# Patient Record
Sex: FEMALE | Race: WHITE | Employment: OTHER | URBAN - METROPOLITAN AREA
[De-identification: names, ages, dates, MRNs, and addresses within clinical notes are randomized per-mention and may not be internally consistent; named-entity substitution may affect disease eponyms.]

---

## 2020-10-20 ENCOUNTER — OFFICE VISIT (OUTPATIENT)
Dept: OPHTHALMOLOGY | Facility: CLINIC | Age: 80
End: 2020-10-20
Attending: OPHTHALMOLOGY
Payer: COMMERCIAL

## 2020-10-20 DIAGNOSIS — H35.61 RETINAL HEMORRHAGE, RIGHT: ICD-10-CM

## 2020-10-20 DIAGNOSIS — H35.353 MACULAR EDEMA, CYSTOID, BILATERAL: ICD-10-CM

## 2020-10-20 DIAGNOSIS — H34.8120 CENTRAL RETINAL VEIN OCCLUSION WITH MACULAR EDEMA OF LEFT EYE (H): Primary | ICD-10-CM

## 2020-10-20 DIAGNOSIS — H35.352 MACULAR EDEMA, CYSTOID, LEFT: ICD-10-CM

## 2020-10-20 DIAGNOSIS — H40.003 GLAUCOMA SUSPECT OF BOTH EYES: ICD-10-CM

## 2020-10-20 PROBLEM — M54.16 LUMBAR NERVE ROOT ENTRAPMENT: Status: ACTIVE | Noted: 2019-08-28

## 2020-10-20 PROBLEM — Z95.0 PRESENCE OF PERMANENT CARDIAC PACEMAKER: Status: ACTIVE | Noted: 2018-09-10

## 2020-10-20 PROBLEM — M71.38 SYNOVIAL CYST OF LUMBAR SPINE: Status: ACTIVE | Noted: 2019-08-28

## 2020-10-20 PROBLEM — K56.609 SMALL BOWEL OBSTRUCTION (H): Status: ACTIVE | Noted: 2019-09-18

## 2020-10-20 PROBLEM — R42 VERTIGO: Status: ACTIVE | Noted: 2019-05-26

## 2020-10-20 PROCEDURE — G0463 HOSPITAL OUTPT CLINIC VISIT: HCPCS | Mod: 25

## 2020-10-20 PROCEDURE — 92015 DETERMINE REFRACTIVE STATE: CPT

## 2020-10-20 PROCEDURE — 92235 FLUORESCEIN ANGRPH MLTIFRAME: CPT | Performed by: OPHTHALMOLOGY

## 2020-10-20 PROCEDURE — 99207 OCT OPTIC NERVE RNFL SPECTRALIS OU (BOTH EYES): CPT | Performed by: OPHTHALMOLOGY

## 2020-10-20 PROCEDURE — 92133 CPTRZD OPH DX IMG PST SGM ON: CPT | Performed by: OPHTHALMOLOGY

## 2020-10-20 PROCEDURE — 250N000011 HC RX IP 250 OP 636: Performed by: OPHTHALMOLOGY

## 2020-10-20 PROCEDURE — 67028 INJECTION EYE DRUG: CPT | Mod: LT | Performed by: OPHTHALMOLOGY

## 2020-10-20 PROCEDURE — 99204 OFFICE O/P NEW MOD 45 MIN: CPT | Mod: 25 | Performed by: OPHTHALMOLOGY

## 2020-10-20 PROCEDURE — 92083 EXTENDED VISUAL FIELD XM: CPT | Performed by: OPHTHALMOLOGY

## 2020-10-20 PROCEDURE — 92134 CPTRZ OPH DX IMG PST SGM RTA: CPT | Performed by: OPHTHALMOLOGY

## 2020-10-20 RX ORDER — ASPIRIN 81 MG/1
81 TABLET ORAL DAILY
COMMUNITY

## 2020-10-20 RX ORDER — ACETAMINOPHEN 500 MG
1000 TABLET ORAL PRN
COMMUNITY
Start: 2019-08-28

## 2020-10-20 RX ORDER — POLYETHYLENE GLYCOL 3350 17 G/17G
17 POWDER, FOR SOLUTION ORAL PRN
COMMUNITY
Start: 2019-08-28

## 2020-10-20 RX ADMIN — Medication 1.25 MG: at 14:20

## 2020-10-20 ASSESSMENT — VISUAL ACUITY
OD_PH_CC+: -2
OD_PH_CC: 20/25
OD_CC: J1
OS_CC: 20/400
OD_CC: 20/40
CORRECTION_TYPE: GLASSES
OS_CC: 3'/200
METHOD: SNELLEN - LINEAR
OD_CC+: +2

## 2020-10-20 ASSESSMENT — CONF VISUAL FIELD
OS_NORMAL: 1
OD_INFERIOR_NASAL_RESTRICTION: 3
METHOD: COUNTING FINGERS

## 2020-10-20 ASSESSMENT — TONOMETRY
OS_IOP_MMHG: 32
OD_IOP_MMHG: 32
IOP_METHOD: APPLANATION

## 2020-10-20 ASSESSMENT — REFRACTION_WEARINGRX
OD_SPHERE: -1.00
OD_AXIS: 025
OD_CYLINDER: +1.00
OS_SPHERE: +2.50
SPECS_TYPE: SVL READING
OD_CYLINDER: SPHERE
OS_SPHERE: +1.50
OD_SPHERE: +2.50
OS_CYLINDER: SPHERE
SPECS_TYPE: SVL DIST
OS_AXIS: 045
OS_CYLINDER: +1.25

## 2020-10-20 ASSESSMENT — REFRACTION_MANIFEST
OD_CYLINDER: +2.00
OD_SPHERE: -1.50
OS_ADD: +2.75
OS_SPHERE: +1.50
OS_CYLINDER: +1.25
OD_ADD: +2.75
OD_AXIS: 165
OS_AXIS: 030

## 2020-10-20 ASSESSMENT — CUP TO DISC RATIO
OD_RATIO: 0.5
OS_RATIO: UNABLE

## 2020-10-20 ASSESSMENT — SLIT LAMP EXAM - LIDS
COMMENTS: PTOSIS
COMMENTS: PTOSIS

## 2020-10-20 ASSESSMENT — EXTERNAL EXAM - RIGHT EYE: OD_EXAM: NORMAL

## 2020-10-20 ASSESSMENT — EXTERNAL EXAM - LEFT EYE: OS_EXAM: NORMAL

## 2020-10-20 NOTE — PROGRESS NOTES
Chief Complaint(s) and History of Present Illness(es)     Decreased Vision Evaluation     Laterality: both eyes    Onset: gradual    Onset: 2 months ago    Quality: blurred vision    Severity: severe    Frequency: constantly    Timing: throughout the day    Context: near vision, distance vision, mid-range vision, reading,   computer work and driving    Course: gradually worsening    Associated symptoms: tearing (intermittent).  Negative for flashes,   floaters, itching, discharge, dryness and eye pain    Pain scale: 0/10        Comments     Bilateral cataract surgery done about 10 years ago. States no post op   complications. No post laser.  'Eyes fine my whole life until I needed cataract surgery', per pt.  Daughter, who assisting with obtaining POH, relates that before eye   surgery that there was a concern of optic nerve abnormalities. Imaging   done with normal findings so decision made to proceed with cataract   surgery.  VA very good until these last few month when VA diminished significantly   with LE exhibiting poor VA than RE.  Eye surgery done in [place]. Last  VA exam in [facility] where new   single vision for distance and near where prescribed. This done 'back   home' in [facility].  Denies hx: ocular injuries, amblyopia, strabismus, eye infections,   glaucoma, corneal disease, ophthalmic neuro findings.   PFOH: none known  PFHH: father with diabetes and stroke.    oRsaura Watts, COT COT 9:01 AM 10/20/2020     PMH: Hx colon cancer 20 years s/p resection, chemo, radiation, hx spinal surgery, Pacemaker   FHX: Denies glaucoma or AMD    POH: CEIOL each eye      Review of systems for the eyes was negative other than the pertinent positives/negatives listed in the HPI.      Assessment & Plan      Raheem Mark is a 80 year old female with the following diagnoses:   1. Central retinal vein occlusion with macular edema of left eye    2. Glaucoma suspect of both eyes    3. Macular edema, cystoid, left    4.  Retinal hemorrhage, right    5. Macular edema, cystoid, bilateral       URVASHI 4 months ago, [place], was told she has something wrong with lens left eye   CEIOL each eye in [place] 2012  No other history of eye surgery or eye disease    Painless vision loss left eye 8-9 months ago, no improvement  No GCA symptoms  Exam consistent with CRVO left eye and macular edema each eye     H/O arrythmia with cardioversion and pacemaker.  Denies history of HTN, Diabetes mellitus, clotting disorders, smoking.  AV nicking right eye and left eye central retinal vein occlusion suspicious for HTN, though there is no history of this per the patient and daughter.  Etiology also possible secondary to glaucoma or coagulopathy (H/O colon cancer - in remission)    Recommend BP check and coagulability work-up (Patient's daughter prefers to do this with cardiology)  Recommend Eylea today left eye, patient elects to proceed (insurance approval only for avastin, will start with this today)  Start Cosopt BID each eye   Follow-up in 1 month, sooner with changes  Return precautions reviewed     Patient disposition:   Return in about 4 weeks (around 11/17/2020) for Follow Up Loaiza likely Eylea OS .    Alda Hammonds MD  Ophthalmology Resident, PGY-4    Attending Physician Attestation:  Complete documentation of historical and exam elements from today's encounter can be found in the full encounter summary report (not reduplicated in this progress note).  I personally obtained the chief complaint(s) and history of present illness.  I confirmed and edited as necessary the review of systems, past medical/surgical history, family history, social history, and examination findings as documented by others; and I examined the patient myself.  I personally reviewed the relevant tests, images, and reports as documented above.  I formulated and edited as necessary the assessment and plan and discussed the findings and management plan with the patient and  family. Attending Physician Image/Tesing Attestation: I personally reviewed the ophthalmic test(s) associated with this encounter, agree with the interpretation(s) as documented by the resident/fellow, and have edited the corresponding report(s) as necessary.  Attending Physician Procedure Attestation: I was present for the entire procedure      - Rubens Loaiza MD

## 2020-10-20 NOTE — NURSING NOTE
Chief Complaints and History of Present Illnesses   Patient presents with     Decreased Vision Evaluation     Chief Complaint(s) and History of Present Illness(es)     Decreased Vision Evaluation     Laterality: both eyes    Onset: gradual    Onset: 2 months ago    Quality: blurred vision    Severity: severe    Frequency: constantly    Timing: throughout the day    Context: near vision, distance vision, mid-range vision, reading, computer work and driving    Course: gradually worsening    Associated symptoms: tearing (intermittent).  Negative for flashes, floaters, itching, discharge, dryness and eye pain    Pain scale: 0/10              Comments     Chief Complaints and History of Present Illnesses   Patient presents with     Decreased Vision Evaluation     Chief Complaint(s) and History of Present Illness(es)     Decreased Vision Evaluation     Laterality: both eyes    Onset: gradual    Onset: 2 months ago    Quality: blurred vision    Severity: severe    Frequency: constantly    Timing: throughout the day    Context: near vision, distance vision, mid-range vision, reading, computer work and driving    Course: gradually worsening    Associated symptoms: tearing (intermittent).  Negative for flashes, floaters, itching, discharge, dryness and eye pain    Pain scale: 0/10              Comments     Bilateral cataract surgery done about 10 years ago. States no post op complications. No post laser.  'Eyes fine my whole life until I needed cataract surgery', per pt.  Daughter, who assisting with obtaining POH, relates that before eye surgery that there was a concern of optic nerve abnormalities. Imaging done with normal findings so decision made to proceed with cataract surgery.  VA very good until these last few month when VA diminished significantly with LE exhibiting poor VA than RE.  Eye surgery done in [place]. Last  VA exam in [facility] where new single vision for distance and near where prescribed. This done  'back home' in [place].  Denies hx: ocular injuries, amblyopia, strabismus, eye infections, glaucoma, corneal disease, ophthalmic neuro findings.   PFOH: none known  PFHH: father with diabetes and stroke.    Rosaura Watts, COT COT 9:01 AM 10/20/2020

## 2020-10-21 ENCOUNTER — TELEPHONE (OUTPATIENT)
Dept: OPHTHALMOLOGY | Facility: CLINIC | Age: 80
End: 2020-10-21

## 2020-10-21 DIAGNOSIS — H40.003 GLAUCOMA SUSPECT OF BOTH EYES: Primary | ICD-10-CM

## 2020-10-21 RX ORDER — DORZOLAMIDE HYDROCHLORIDE AND TIMOLOL MALEATE 20; 5 MG/ML; MG/ML
1 SOLUTION/ DROPS OPHTHALMIC 2 TIMES DAILY
Qty: 1 BOTTLE | Refills: 11 | Status: SHIPPED | OUTPATIENT
Start: 2020-10-21 | End: 2021-08-12

## 2020-10-21 NOTE — TELEPHONE ENCOUNTER
Pt/daughter calling to review where eye drop from yesterday's visit was sent    Updated pharmacy in system and sent Rx for Cosopt to [facility] per request    Patrick Ghosh RN 1:48 PM 10/21/20

## 2020-11-24 ENCOUNTER — TELEPHONE (OUTPATIENT)
Dept: OPHTHALMOLOGY | Facility: CLINIC | Age: 80
End: 2020-11-24

## 2020-11-24 NOTE — TELEPHONE ENCOUNTER
[facility] Call Center    Phone Message    May a detailed message be left on voicemail: yes     Reason for Call: Other:   Pt's daughter, Dr Nguyen calling to reschedule pt's appt with Fadia. Daughter would like an appt the week of 12/14. Any day will work but request that it must be with Fadia because they need an injection. Please follow-up with Daugther.     Action Taken: Other:  eye    Travel Screening: Not Applicable

## 2020-11-25 NOTE — TELEPHONE ENCOUNTER
Previously reviewed by Dr. Loaiza and ok push out couple weeks in lieu of any new vision changes/symptoms    Scheduled December 17th at 8:15 AM    Pt/daughter aware of date/time/location at B    Patrick Ghosh RN 1:25 PM 11/25/20      [facility] Call Center    Phone Message    May a detailed message be left on voicemail: yes     Reason for Call: Other: Pt's Daughter callingt [facility] about her Mother seeing Dr Loaiza, she can be Reached at tn- [phone], we had the wrong # to call her back, Please call back to discuss  Thank you,    Action Taken: Message routed to:  [facility]    Travel Screening: Not Applicable

## 2020-12-01 ENCOUNTER — OFFICE VISIT (OUTPATIENT)
Dept: OPHTHALMOLOGY | Facility: CLINIC | Age: 80
End: 2020-12-01
Attending: OPHTHALMOLOGY
Payer: COMMERCIAL

## 2020-12-01 DIAGNOSIS — H04.129 DRY EYE: ICD-10-CM

## 2020-12-01 DIAGNOSIS — H35.352 MACULAR EDEMA, CYSTOID, LEFT: ICD-10-CM

## 2020-12-01 DIAGNOSIS — H02.135 SENILE ECTROPION OF BOTH LOWER EYELIDS: ICD-10-CM

## 2020-12-01 DIAGNOSIS — H34.8120 CENTRAL RETINAL VEIN OCCLUSION WITH MACULAR EDEMA OF LEFT EYE (H): Primary | ICD-10-CM

## 2020-12-01 DIAGNOSIS — H02.132 SENILE ECTROPION OF BOTH LOWER EYELIDS: ICD-10-CM

## 2020-12-01 PROCEDURE — 250N000011 HC RX IP 250 OP 636: Performed by: OPHTHALMOLOGY

## 2020-12-01 PROCEDURE — 67028 INJECTION EYE DRUG: CPT | Mod: LT | Performed by: OPHTHALMOLOGY

## 2020-12-01 PROCEDURE — G0463 HOSPITAL OUTPT CLINIC VISIT: HCPCS | Mod: 25

## 2020-12-01 PROCEDURE — 92134 CPTRZ OPH DX IMG PST SGM RTA: CPT | Performed by: OPHTHALMOLOGY

## 2020-12-01 RX ADMIN — AFLIBERCEPT 2 MG: 40 INJECTION, SOLUTION INTRAVITREAL at 11:06

## 2020-12-01 ASSESSMENT — VISUAL ACUITY
METHOD: SNELLEN - LINEAR
OD_SC: 20/25
OD_SC+: -1
OS_SC: 3'/200E

## 2020-12-01 ASSESSMENT — CONF VISUAL FIELD
OS_INFERIOR_NASAL_RESTRICTION: 2
OS_SUPERIOR_TEMPORAL_RESTRICTION: 2
OS_SUPERIOR_NASAL_RESTRICTION: 2
OS_INFERIOR_TEMPORAL_RESTRICTION: 2
OD_NORMAL: 1

## 2020-12-01 ASSESSMENT — REFRACTION_WEARINGRX
OS_SPHERE: +2.50
OD_SPHERE: -1.00
OD_CYLINDER: SPHERE
OS_SPHERE: +1.50
OS_CYLINDER: SPHERE
OD_AXIS: 025
SPECS_TYPE: SVL READING
SPECS_TYPE: SVL DIST
OS_CYLINDER: +1.25
OD_CYLINDER: +1.00
OS_AXIS: 045
OD_SPHERE: +2.50

## 2020-12-01 ASSESSMENT — TONOMETRY
OS_IOP_MMHG: 15
OD_IOP_MMHG: 15
IOP_METHOD: TONOPEN

## 2020-12-01 ASSESSMENT — EXTERNAL EXAM - RIGHT EYE: OD_EXAM: NORMAL

## 2020-12-01 ASSESSMENT — CUP TO DISC RATIO
OD_RATIO: 0.5
OS_RATIO: 0.45

## 2020-12-01 ASSESSMENT — EXTERNAL EXAM - LEFT EYE: OS_EXAM: NORMAL

## 2020-12-01 NOTE — PROGRESS NOTES
Chief Complaint(s) and History of Present Illness(es)     Central Retinal Vein Occlusion Follow Up     Laterality: left eye    Onset: 1 month ago           Comments     Pt. States that VA did improve slightly LE. Occasional pain BE. No   flashes or floaters BE.  Jojo Chidi COT 9:42 AM December 1, 2020         Ms. Mark here for follow up for CRVO, received intravitreal injection EYELEA x 1 LEFT eye 1 month ago.        Review of systems for the eyes was negative other than the pertinent positives/negatives listed in the HPI.      OCT MAC:   right eye: interval increase in retinal thickness nasal to macula, stil with good foveal contour  Left eye: large anatomical improvement in CME from 1 month ago, still with IRF, irregular contour, central involved     Assessment & Plan      Raheem Mark is a 80 year old female with the following diagnoses:   1. Central retinal vein occlusion with macular edema of left eye    2. Senile ectropion of both lower eyelids    3. Dry eye    4. Macular edema, cystoid, left       Plan  RBA to Eylea left eye today, consent  Obtained  Recommend low vision eval at next visit to help determine best visual potential for future goal discussion      Dry eye - secondary to poor lid function   Discussed with the patient trialing over the counter artificial tears gels and ointments for which are best tolerated.  Encouraged regular use due to significant dryness in lower corneas.     Patient disposition:   Return in about 4 weeks (around 12/29/2020) for VT, gonio, OCT mac; Low vision refraction same day if possible.    Winifred Tinsley MD PGY2  Department of Ophthalmology    Attending Physician Attestation:  Complete documentation of historical and exam elements from today's encounter can be found in the full encounter summary report (not reduplicated in this progress note).  I personally obtained the chief complaint(s) and history of present illness.  I confirmed and edited as necessary the  review of systems, past medical/surgical history, family history, social history, and examination findings as documented by others; and I examined the patient myself.  I personally reviewed the relevant tests, images, and reports as documented above.  I formulated and edited as necessary the assessment and plan and discussed the findings and management plan with the patient and family. .Attending Physician Procedure Attestation: I was present for the entire procedure      - Rubens Loaiza MD

## 2020-12-01 NOTE — NURSING NOTE
Chief Complaints and History of Present Illnesses   Patient presents with     Central Retinal Vein Occlusion Follow Up     Chief Complaint(s) and History of Present Illness(es)     Central Retinal Vein Occlusion Follow Up     Laterality: left eye    Onset: 1 month ago              Comments     Pt. States that VA did improve slightly LE. Occasional pain BE. No flashes or floaters BE.  Jojo Murillo COT 9:42 AM December 1, 2020

## 2020-12-10 ENCOUNTER — TELEPHONE (OUTPATIENT)
Dept: OPHTHALMOLOGY | Facility: CLINIC | Age: 80
End: 2020-12-10

## 2020-12-10 NOTE — TELEPHONE ENCOUNTER
[phone]  Rescheduled appt separately    Daughter aware of new dates and times and seemed comfortable with margie Ghosh RN 5:08 PM 12/10/20              [facility] Call [facility]    Phone Message    May a detailed message be left on voicemail: yes     Reason for Call: Other: Pt daughter requesting Pt Appt's be moved up to sometime in December before the end of the year - did not disclose why and said she would tell you when you call her back - she wants to move up the 01/07/21 Appts with Dr Saucedo and Dr Loaiza on same day again but sooner and this year in 2020 - please call Pt daughter Dr Ronal Nguyen back at updated correct Ph # in chart - Thanks     Action Taken: Message routed to:  [facility] E[facility]    Travel Screening: Not Applicable

## 2020-12-16 ENCOUNTER — OFFICE VISIT (OUTPATIENT)
Dept: OPHTHALMOLOGY | Facility: CLINIC | Age: 80
End: 2020-12-16
Payer: COMMERCIAL

## 2020-12-16 DIAGNOSIS — H52.11 MYOPIA OF RIGHT EYE: Primary | ICD-10-CM

## 2020-12-16 DIAGNOSIS — H52.4 PRESBYOPIA: ICD-10-CM

## 2020-12-16 DIAGNOSIS — H34.8120 CENTRAL RETINAL VEIN OCCLUSION WITH MACULAR EDEMA OF LEFT EYE (H): ICD-10-CM

## 2020-12-16 PROCEDURE — 92015 DETERMINE REFRACTIVE STATE: CPT | Performed by: OPTOMETRIST

## 2020-12-16 PROCEDURE — 92012 INTRM OPH EXAM EST PATIENT: CPT | Performed by: OPTOMETRIST

## 2020-12-16 ASSESSMENT — TONOMETRY
OS_IOP_MMHG: 16
IOP_METHOD: ICARE
OD_IOP_MMHG: 17

## 2020-12-16 ASSESSMENT — VISUAL ACUITY
OS_SC: 20/125
OS_SC+: -1
OD_SC+: -2
OD_SC: 20/40
METHOD: SNELLEN - LINEAR

## 2020-12-16 ASSESSMENT — REFRACTION_MANIFEST
OD_ADD: +2.50
OD_CYLINDER: SPHERE
OD_SPHERE: -0.75

## 2020-12-16 ASSESSMENT — CONF VISUAL FIELD
METHOD: COUNTING FINGERS
OS_NORMAL: 1
OD_NORMAL: 1

## 2020-12-16 NOTE — NURSING NOTE
Chief Complaints and History of Present Illnesses   Patient presents with     Consult For     Referral from Dr. Loaiza for Low Vision eval     Chief Complaint(s) and History of Present Illness(es)     Consult For     Laterality: left eye    Quality: blurred vision    Severity: severe    Course: stable    Associated symptoms: Negative for eye pain, dryness, tearing and discharge    Pain scale: 0/10    Comments: Referral from Dr. Loaiza for Low Vision eval              Comments     Pt complains of vision LE being very blurry.  Denies any pain, irritation, discharge, and tearing.  Ocular meds: Cosopt BID BE    Gill Tavera OT 12:10 PM December 16, 2020

## 2020-12-16 NOTE — PROGRESS NOTES
History  HPI     Consult For     In left eye.  Charactertized as  blurred vision.  Severity is severe.  Since onset it is stable.  Associated symptoms include Negative for eye pain, dryness, tearing and discharge.  Pain was noted as 0/10. Additional comments: Referral from Dr. Loaiza for Low Vision eval              Comments     Pt complains of vision LE being very blurry.  Denies any pain, irritation, discharge, and tearing.  Ocular meds: Cosopt BID BE    Gilllucio Lópezvin OT 12:10 PM December 16, 2020             Last edited by Gill Tavera on 12/16/2020 12:10 PM. (History)          Assessment/Plan  (H52.11) Myopia of right eye  (primary encounter diagnosis)  Comment: Simple myopia right eye; no improvement with refraction left eye.  Plan: Patient and daughter educated on condition and visual status.  New DVO and NVO SRx released.  Recommended separate glasses due to difficulty with mobility and lack of previous bifocal wear.  Advised full-time wear to protect better seeing eye.  Monitor yearly.    (H52.4) Presbyopia  Plan: See plan 1.    (H34.4120) Central retinal vein occlusion with macular edema of left eye  Comment: Stable.  Plan: Instructed patient to continue care with Dr. Loaiza.    Return to clinic in 1 year for comprehensive eye exam.    Jessica Cain, Optometry Student      I agree with the attached exam findings. I have reviewed and agree with the plan stated above.     Complete documentation of historical and exam elements from today's encounter can  be found in the full encounter summary report (not reduplicated in this progress  note). I personally obtained the chief complaint(s) and history of present illness. I  confirmed and edited as necessary the review of systems, past medical/surgical  history, family history, social history, and examination findings as documented by  others; and I examined the patient myself. I personally reviewed the relevant tests,  images, and reports as documented  above. I formulated and edited as necessary the  assessment and plan and discussed the findings and management plan with the  patient and family.     Ant Saucedo OD

## 2020-12-24 ENCOUNTER — OFFICE VISIT (OUTPATIENT)
Dept: OPHTHALMOLOGY | Facility: CLINIC | Age: 80
End: 2020-12-24
Attending: OPHTHALMOLOGY
Payer: COMMERCIAL

## 2020-12-24 DIAGNOSIS — H34.8120 CENTRAL RETINAL VEIN OCCLUSION WITH MACULAR EDEMA OF LEFT EYE (H): Primary | ICD-10-CM

## 2020-12-24 PROCEDURE — 92134 CPTRZ OPH DX IMG PST SGM RTA: CPT | Performed by: OPHTHALMOLOGY

## 2020-12-24 PROCEDURE — G0463 HOSPITAL OUTPT CLINIC VISIT: HCPCS

## 2020-12-24 PROCEDURE — 99213 OFFICE O/P EST LOW 20 MIN: CPT | Performed by: OPHTHALMOLOGY

## 2020-12-24 ASSESSMENT — CONF VISUAL FIELD
OS_NORMAL: 1
OD_NORMAL: 1
METHOD: COUNTING FINGERS

## 2020-12-24 ASSESSMENT — EXTERNAL EXAM - RIGHT EYE: OD_EXAM: NORMAL

## 2020-12-24 ASSESSMENT — VISUAL ACUITY
METHOD: SNELLEN - LINEAR
OS_SC+: -1
OS_SC: 20/125
OD_SC: 20/50
OD_SC+: +1

## 2020-12-24 ASSESSMENT — TONOMETRY
IOP_METHOD: ICARE
OD_IOP_MMHG: 12
OS_IOP_MMHG: 12

## 2020-12-24 ASSESSMENT — EXTERNAL EXAM - LEFT EYE: OS_EXAM: NORMAL

## 2020-12-24 NOTE — PROGRESS NOTES
Chief Complaint(s) and History of Present Illness(es)     Central Retinal Vein Occlusion Follow Up     Laterality: left eye    Associated symptoms: Negative for dryness, floaters and flashes    Pain scale: 0/10    Comments: 3 week f/u               Comments     Patient is using Cosopt BID OU: last used 8 pm last night, pt accompanied   with daughter, she states there is no new concerns or changes from last   visit. Unsure if there is any improvement with vision subjectively but   noted her OD visit VA was better. Pt ordered gls, has not picked up yet.     Rosanna Ramos COT December 24, 2020 8:15 AM              Review of systems for the eyes was negative other than the pertinent positives/negatives listed in the HPI.      Assessment & Plan      Raheem Mark is a 80 year old female with the following diagnoses:   1. Central retinal vein occlusion with macular edema of left eye         S/P Eylea left eye 12/1/20  S/P Avastin 10/20/20      Significant improvement in edema and vision today   Intraocular pressure acceptable  Monitor for now  Return precautions reviewed     Appreciate low vision eval.  Awaiting new glasses  Continue artificial tears and ointment at bedtime     Patient disposition:   Return in about 2 months (around 2/24/2021) for VT only, OCT Macula.           Attending Physician Attestation:  Complete documentation of historical and exam elements from today's encounter can be found in the full encounter summary report (not reduplicated in this progress note).  I personally obtained the chief complaint(s) and history of present illness.  I confirmed and edited as necessary the review of systems, past medical/surgical history, family history, social history, and examination findings as documented by others; and I examined the patient myself.  I personally reviewed the relevant tests, images, and reports as documented above.  I formulated and edited as necessary the assessment and plan and discussed the  findings and management plan with the patient and family. . - Rubens Loaiza MD

## 2020-12-24 NOTE — NURSING NOTE
Chief Complaints and History of Present Illnesses   Patient presents with     Central Retinal Vein Occlusion Follow Up     3 week f/u      Chief Complaint(s) and History of Present Illness(es)     Central Retinal Vein Occlusion Follow Up     Laterality: left eye    Associated symptoms: Negative for dryness, floaters and flashes    Pain scale: 0/10    Comments: 3 week f/u               Comments     Patient is using Cosopt BID OU: last used 8 pm last night, pt accompanied with daughter, she states there is no new concerns or changes from last visit. Unsure if there is any improvement with vision subjectively but noted her OD visit VA was better. Pt ordered gls, has not picked up yet.     Rosanna Ramos COT December 24, 2020 8:15 AM

## 2021-04-23 ENCOUNTER — TELEPHONE (OUTPATIENT)
Dept: OPHTHALMOLOGY | Facility: CLINIC | Age: 81
End: 2021-04-23

## 2021-04-23 NOTE — TELEPHONE ENCOUNTER
Scheduled may 4th at 0815     Pt/shelbie aware of date/time/location and seemed satisfied with appointment    Patrick Ghosh RN 10:57 AM 04/23/21        [facility] Call Center    Phone Message    May a detailed message be left on voicemail: yes     Reason for Call: Other: The pt is asking for an appt w/Dr. Loaiza the week of 5.3.21. His first avail is 5.18.21. Her daughter is home from [facility] and the pt needs her to bring her as she is 80 years old and in a wheelchair. Please call the pt to discuss. Thanks.     Action Taken: Message routed to:  [facility]    Travel Screening: Not Applicable

## 2021-05-04 ENCOUNTER — OFFICE VISIT (OUTPATIENT)
Dept: OPHTHALMOLOGY | Facility: CLINIC | Age: 81
End: 2021-05-04
Attending: OPHTHALMOLOGY
Payer: COMMERCIAL

## 2021-05-04 DIAGNOSIS — H34.8120 CENTRAL RETINAL VEIN OCCLUSION WITH MACULAR EDEMA OF LEFT EYE (H): Primary | ICD-10-CM

## 2021-05-04 DIAGNOSIS — Z96.1 PSEUDOPHAKIA OF BOTH EYES: ICD-10-CM

## 2021-05-04 DIAGNOSIS — H02.132 SENILE ECTROPION OF BOTH LOWER EYELIDS: ICD-10-CM

## 2021-05-04 DIAGNOSIS — H40.003 GLAUCOMA SUSPECT OF BOTH EYES: ICD-10-CM

## 2021-05-04 DIAGNOSIS — H02.135 SENILE ECTROPION OF BOTH LOWER EYELIDS: ICD-10-CM

## 2021-05-04 PROCEDURE — 92134 CPTRZ OPH DX IMG PST SGM RTA: CPT | Performed by: OPHTHALMOLOGY

## 2021-05-04 PROCEDURE — 250N000011 HC RX IP 250 OP 636: Performed by: OPHTHALMOLOGY

## 2021-05-04 PROCEDURE — 99214 OFFICE O/P EST MOD 30 MIN: CPT | Mod: 25 | Performed by: OPHTHALMOLOGY

## 2021-05-04 PROCEDURE — 67028 INJECTION EYE DRUG: CPT | Mod: LT | Performed by: OPHTHALMOLOGY

## 2021-05-04 PROCEDURE — G0463 HOSPITAL OUTPT CLINIC VISIT: HCPCS

## 2021-05-04 RX ADMIN — AFLIBERCEPT 2 MG: 40 INJECTION, SOLUTION INTRAVITREAL at 10:10

## 2021-05-04 ASSESSMENT — REFRACTION_WEARINGRX
SPECS_TYPE: SVL - DISTANCE
OD_SPHERE: +1.75
OS_CYLINDER: SPHERE
OS_SPHERE: +1.50
SPECS_TYPE: SVL READERS
OS_SPHERE: -0.50
OD_CYLINDER: SPHERE
OD_SPHERE: -0.75
OD_CYLINDER: SPHERE
OS_CYLINDER: SPHERE

## 2021-05-04 ASSESSMENT — CONF VISUAL FIELD
OS_SUPERIOR_TEMPORAL_RESTRICTION: 3
OD_SUPERIOR_NASAL_RESTRICTION: 3
OD_SUPERIOR_TEMPORAL_RESTRICTION: 3
OS_SUPERIOR_NASAL_RESTRICTION: 3

## 2021-05-04 ASSESSMENT — VISUAL ACUITY
CORRECTION_TYPE: GLASSES
OD_CC: 20/25
METHOD: SNELLEN - LINEAR
OD_CC+: -1
OS_CC: 20/500
OD_CC: J2
OS_CC: 20/400

## 2021-05-04 ASSESSMENT — EXTERNAL EXAM - RIGHT EYE: OD_EXAM: NORMAL

## 2021-05-04 ASSESSMENT — TONOMETRY
IOP_METHOD: TONOPEN
OS_IOP_MMHG: 12
OD_IOP_MMHG: 12

## 2021-05-04 ASSESSMENT — EXTERNAL EXAM - LEFT EYE: OS_EXAM: NORMAL

## 2021-05-04 ASSESSMENT — CUP TO DISC RATIO
OS_RATIO: 0.45
OD_RATIO: 0.5

## 2021-05-04 NOTE — PROGRESS NOTES
Review of systems for the eyes was negative other than the pertinent positives/negatives listed in the HPI.      Assessment & Plan      Raheem Mark is a 80 year old female with the following diagnoses:   1. Central retinal vein occlusion with macular edema of left eye    2. Glaucoma suspect of both eyes    3. Senile ectropion of both lower eyelids    4. Pseudophakia of both eyes         S/P Eylea left eye 12/1/20  S/P Avastin 10/20/20      Significant recurrence in left eye retinal edema with worsened vision. Difficulty in reading per daughter  No NV of retina, nerve, iris, angle today.  Intraocular pressure remains acceptable  RBA of left eye intravitreal Eylea today. Consent obtained.     Likely will need multiple Eylea for maximal edema improvement. Consider PRP and possibly STK after edema resolved  Repeat FA in near future  Return precautions reviewed   Continue artificial tears and ointment at bedtime     Patient disposition:   Return in about 1 month (around 6/4/2021) for dilated exam, oct macula, Eylea left eye. Sooner if needed.           Rashaun Dorantes MD  Department of Ophthalmology - PGY4      Attending Physician Attestation:  Complete documentation of historical and exam elements from today's encounter can be found in the full encounter summary report (not reduplicated in this progress note).  I personally obtained the chief complaint(s) and history of present illness.  I confirmed and edited as necessary the review of systems, past medical/surgical history, family history, social history, and examination findings as documented by others; and I examined the patient myself.  I personally reviewed the relevant tests, images, and reports as documented above.  I formulated and edited as necessary the assessment and plan and discussed the findings and management plan with the patient and family. Attending Physician Image/Tesing Attestation: I personally reviewed the ophthalmic test(s) associated with this  encounter, agree with the interpretation(s) as documented by the resident/fellow, and have edited the corresponding report(s) as necessary.   Attending Physician Procedure Attestation: I was present for the entire procedure     . - Rubens Loaiza MD

## 2021-05-04 NOTE — NURSING NOTE
Chief Complaints and History of Present Illnesses   Patient presents with     Follow Up     4 month f/u central retinal vein occlusion with macular edema of left eye     Chief Complaint(s) and History of Present Illness(es)     Follow Up     Laterality: left eye    Onset: 4.5 months ago    Associated symptoms: Negative for eye pain, flashes, floaters and dryness    Pain scale: 0/10    Comments: 4 month f/u central retinal vein occlusion with macular edema of left eye              Comments     Pt reports left eye vision seems worse - notes more difficulty with reading with her newer reading glasses  Pt's daughter states she hasn't needed to use AT's    Ocular meds:  Cosopt BID, both eyes    UNIQUE Valverde 8:41 AM May 4, 2021

## 2021-06-08 ENCOUNTER — OFFICE VISIT (OUTPATIENT)
Dept: OPHTHALMOLOGY | Facility: CLINIC | Age: 81
End: 2021-06-08
Attending: OPHTHALMOLOGY
Payer: COMMERCIAL

## 2021-06-08 DIAGNOSIS — H02.135 SENILE ECTROPION OF BOTH LOWER EYELIDS: ICD-10-CM

## 2021-06-08 DIAGNOSIS — H02.132 SENILE ECTROPION OF BOTH LOWER EYELIDS: ICD-10-CM

## 2021-06-08 DIAGNOSIS — H34.8120 CENTRAL RETINAL VEIN OCCLUSION WITH MACULAR EDEMA OF LEFT EYE (H): Primary | ICD-10-CM

## 2021-06-08 DIAGNOSIS — Z96.1 PSEUDOPHAKIA OF BOTH EYES: ICD-10-CM

## 2021-06-08 DIAGNOSIS — H40.003 GLAUCOMA SUSPECT OF BOTH EYES: ICD-10-CM

## 2021-06-08 PROCEDURE — G0463 HOSPITAL OUTPT CLINIC VISIT: HCPCS

## 2021-06-08 PROCEDURE — 92134 CPTRZ OPH DX IMG PST SGM RTA: CPT | Performed by: OPHTHALMOLOGY

## 2021-06-08 PROCEDURE — 67515 INJECT/TREAT EYE SOCKET: CPT | Mod: LT | Performed by: OPHTHALMOLOGY

## 2021-06-08 PROCEDURE — 250N000011 HC RX IP 250 OP 636: Performed by: OPHTHALMOLOGY

## 2021-06-08 RX ORDER — TRIAMCINOLONE ACETONIDE 40 MG/ML
40 INJECTION, SUSPENSION INTRA-ARTICULAR; INTRAMUSCULAR ONCE
Status: COMPLETED | OUTPATIENT
Start: 2021-06-08 | End: 2021-06-08

## 2021-06-08 RX ADMIN — TRIAMCINOLONE ACETONIDE 40 MG: 40 INJECTION, SUSPENSION INTRA-ARTICULAR; INTRAMUSCULAR at 10:57

## 2021-06-08 ASSESSMENT — VISUAL ACUITY
OS_PH_CC: 20/300
OS_CC: 20/500
CORRECTION_TYPE: GLASSES
METHOD: SNELLEN - LINEAR
OD_PH_CC+: -2
OD_PH_CC: 20/25
OD_CC: 20/40

## 2021-06-08 ASSESSMENT — CONF VISUAL FIELD
METHOD: COUNTING FINGERS
OD_SUPERIOR_NASAL_RESTRICTION: 3
OD_SUPERIOR_TEMPORAL_RESTRICTION: 3
OS_SUPERIOR_TEMPORAL_RESTRICTION: 3
OS_SUPERIOR_NASAL_RESTRICTION: 3

## 2021-06-08 ASSESSMENT — TONOMETRY
OD_IOP_MMHG: 17
IOP_METHOD: TONOPEN
OS_IOP_MMHG: 15

## 2021-06-08 ASSESSMENT — CUP TO DISC RATIO
OS_RATIO: 0.45
OD_RATIO: 0.5

## 2021-06-08 ASSESSMENT — EXTERNAL EXAM - LEFT EYE: OS_EXAM: NORMAL

## 2021-06-08 ASSESSMENT — EXTERNAL EXAM - RIGHT EYE: OD_EXAM: NORMAL

## 2021-06-08 NOTE — PROGRESS NOTES
Chief Complaint(s) and History of Present Illness(es)     Central Retinal Vein Occlusion Follow Up     Laterality: left eye    Quality: blurred vision    Frequency: constantly    Timing: throughout the day    Course: gradually improving    Associated symptoms: Negative for eye pain, redness, burning, dryness,   itching and discharge    Pain scale: 0/10    Comments: Recheck CRVO of LE              Comments     Pt states feels can see 'a little bit better'. Voices no new concerns.  Cosopt BID to BE / LD @ 8 am today.    Rosaura Watts, COT COT 9:56 AM 06/08/2021                Review of systems for the eyes was negative other than the pertinent positives/negatives listed in the HPI.      Assessment & Plan      Raheem Mark is a 81 year old female with the following diagnoses:   1. Central retinal vein occlusion with macular edema of left eye    2. Glaucoma suspect of both eyes    3. Pseudophakia of both eyes    4. Senile ectropion of both lower eyelids       S/P Eylea left eye 12/1/20; 5/4/2021  S/P Avastin 10/20/20    Resolution of retinal edema today.  No evidence of neovasularization on dilated fundus exam.  Notes vision has improved    Intraocular pressure remains acceptable  RBA of left eye intravitreal SubTenon's kenalog today. Consent obtained.     Will get OCT and Fluorescein angiography next visit  Consider PRP and possible repeat STK  Return precautions reviewed   Continue artificial tears and ointment at bedtime       Patient disposition:   Return in about 3 months (around 9/8/2021) for DFE, Optos FA (transit left); OCT Macula.           Attending Physician Attestation:  Complete documentation of historical and exam elements from today's encounter can be found in the full encounter summary report (not reduplicated in this progress note).  I personally obtained the chief complaint(s) and history of present illness.  I confirmed and edited as necessary the review of systems, past medical/surgical history,  family history, social history, and examination findings as documented by others; and I examined the patient myself.  I personally reviewed the relevant tests, images, and reports as documented above.  I formulated and edited as necessary the assessment and plan and discussed the findings and management plan with the patient and family. . - Rubens Loaiza MD

## 2021-06-08 NOTE — NURSING NOTE
Chief Complaints and History of Present Illnesses   Patient presents with     Central Retinal Vein Occlusion Follow Up     Recheck CRVO of LE     Chief Complaint(s) and History of Present Illness(es)     Central Retinal Vein Occlusion Follow Up     Laterality: left eye    Quality: blurred vision    Frequency: constantly    Timing: throughout the day    Course: gradually improving    Associated symptoms: Negative for eye pain, redness, burning, dryness, itching and discharge    Pain scale: 0/10    Comments: Recheck CRVO of LE              Comments     Pt states feels can see 'a little bit better'. Voices no new concerns.  Cosopt BID to BE / LD @ 8 am today.    MUNA Jiménez COT 9:56 AM 06/08/2021

## 2021-08-10 DIAGNOSIS — H34.8120 CENTRAL RETINAL VEIN OCCLUSION WITH MACULAR EDEMA OF LEFT EYE (H): Primary | ICD-10-CM

## 2021-08-12 ENCOUNTER — OFFICE VISIT (OUTPATIENT)
Dept: OPHTHALMOLOGY | Facility: CLINIC | Age: 81
End: 2021-08-12
Attending: OPHTHALMOLOGY
Payer: COMMERCIAL

## 2021-08-12 DIAGNOSIS — Z96.1 PSEUDOPHAKIA OF BOTH EYES: ICD-10-CM

## 2021-08-12 DIAGNOSIS — H40.003 GLAUCOMA SUSPECT OF BOTH EYES: Primary | ICD-10-CM

## 2021-08-12 DIAGNOSIS — H34.8120 CENTRAL RETINAL VEIN OCCLUSION WITH MACULAR EDEMA OF LEFT EYE (H): ICD-10-CM

## 2021-08-12 PROCEDURE — 99214 OFFICE O/P EST MOD 30 MIN: CPT | Mod: GC | Performed by: OPHTHALMOLOGY

## 2021-08-12 PROCEDURE — G0463 HOSPITAL OUTPT CLINIC VISIT: HCPCS | Mod: 25

## 2021-08-12 PROCEDURE — 92134 CPTRZ OPH DX IMG PST SGM RTA: CPT | Performed by: OPHTHALMOLOGY

## 2021-08-12 PROCEDURE — 92235 FLUORESCEIN ANGRPH MLTIFRAME: CPT | Performed by: OPHTHALMOLOGY

## 2021-08-12 RX ORDER — DORZOLAMIDE HYDROCHLORIDE AND TIMOLOL MALEATE 20; 5 MG/ML; MG/ML
1 SOLUTION/ DROPS OPHTHALMIC 2 TIMES DAILY
Qty: 60 ML | Refills: 3 | Status: SHIPPED | OUTPATIENT
Start: 2021-08-12 | End: 2022-08-25

## 2021-08-12 ASSESSMENT — VISUAL ACUITY
OD_CC+: +2
OD_CC: 20/25
METHOD: SNELLEN - LINEAR
OS_CC: 20/500
OS_SC: 20/500
OD_SC: 20/40

## 2021-08-12 ASSESSMENT — REFRACTION_WEARINGRX
OS_SPHERE: +1.50
OS_CYLINDER: SPHERE
OD_CYLINDER: SPHERE
OD_CYLINDER: SPHERE
OD_SPHERE: -0.75
SPECS_TYPE: SVL - DISTANCE
OS_SPHERE: -0.50
OD_SPHERE: +1.75
OS_CYLINDER: SPHERE
SPECS_TYPE: SVL READERS

## 2021-08-12 ASSESSMENT — EXTERNAL EXAM - RIGHT EYE: OD_EXAM: NORMAL

## 2021-08-12 ASSESSMENT — CONF VISUAL FIELD
OD_SUPERIOR_TEMPORAL_RESTRICTION: 3
OS_SUPERIOR_NASAL_RESTRICTION: 3
OD_SUPERIOR_NASAL_RESTRICTION: 3
OS_SUPERIOR_TEMPORAL_RESTRICTION: 3

## 2021-08-12 ASSESSMENT — CUP TO DISC RATIO
OD_RATIO: 0.5
OS_RATIO: 0.45

## 2021-08-12 ASSESSMENT — TONOMETRY
OS_IOP_MMHG: 19
OD_IOP_MMHG: 18
IOP_METHOD: TONOPEN

## 2021-08-12 ASSESSMENT — EXTERNAL EXAM - LEFT EYE: OS_EXAM: NORMAL

## 2021-08-12 NOTE — PROGRESS NOTES
Chief Complaint(s) and History of Present Illness(es)     Follow Up     Laterality: left eye    Course: stable    Associated symptoms: dryness (intermittent) and eye pain (inermittent,   mild).  Negative for redness and headache    Treatments tried: eye drops    Pain scale: 0/10    Comments: Central retinal vein occlusion with macular edema of left eye              Comments     She states that her vision has seemed stable in both eyes since her last   eye exam.  Both eyes feel dry at times.    She uses Cosopt twice a day in each eye.    MUNA Luz 2:22 PM  August 12, 2021               Review of systems for the eyes was negative other than the pertinent positives/negatives listed in the HPI.      Assessment & Plan      Raheem Mark is a 81 year old female with the following diagnoses:   1. Glaucoma suspect of both eyes    2. Central retinal vein occlusion with macular edema of left eye    3. Pseudophakia of both eyes         S/P Eylea left eye 12/1/20; 5/4/2021  S/P Avastin 10/20/20  S/P left eye STK June 2021    No retinal edema Left eye today.  No evidence of neovasularization on dilated fundus exam.   FA left eye shows macular edema, capillary leakages and possible peripheral ischemia    Right eye new single intraretinal fluid cyst on MAC OCT, not visually significant.   Unclear etiology, no leakage on Fluorescein angiography  Will continue to monitor.     Intraocular pressure remains acceptable    Will be travelling back to [facility] this Fall and returning in the Spring  Return precautions reviewed   Continue cosopt twice a day both eyes   Continue artificial tears and ointment at bedtime   Return sooner if vision worsens    Patient disposition:   Return in about 6 months (around 2/12/2022) for DFE, OCT Macula, OCT NFL.    Mildred Velazquez MD  Ophthalmology PGY-4      Attending Physician Attestation:  Complete documentation of historical and exam elements from today's encounter can be found in the full  encounter summary report (not reduplicated in this progress note).  I personally obtained the chief complaint(s) and history of present illness.  I confirmed and edited as necessary the review of systems, past medical/surgical history, family history, social history, and examination findings as documented by others; and I examined the patient myself.  I personally reviewed the relevant tests, images, and reports as documented above.  I formulated and edited as necessary the assessment and plan and discussed the findings and management plan with the patient and family.Attending Physician Image/Tesing Attestation: I personally reviewed the ophthalmic test(s) associated with this encounter, agree with the interpretation(s) as documented by the resident/fellow, and have edited the corresponding report(s) as necessary.   . - Rubens Loaiza MD

## 2021-08-12 NOTE — NURSING NOTE
Chief Complaints and History of Present Illnesses   Patient presents with     Follow Up     Central retinal vein occlusion with macular edema of left eye     Chief Complaint(s) and History of Present Illness(es)     Follow Up     Laterality: left eye    Course: stable    Associated symptoms: dryness (intermittent) and eye pain (inermittent, mild).  Negative for redness and headache    Treatments tried: eye drops    Pain scale: 0/10    Comments: Central retinal vein occlusion with macular edema of left eye              Comments     She states that her vision has seemed stable in both eyes since her last eye exam.  Both eyes feel dry at times.    She uses Cosopt twice a day in each eye.    Criss Mahoney, COT 2:22 PM  August 12, 2021

## 2022-01-20 ENCOUNTER — TELEPHONE (OUTPATIENT)
Dept: OPHTHALMOLOGY | Facility: CLINIC | Age: 82
End: 2022-01-20
Payer: COMMERCIAL

## 2022-01-20 NOTE — TELEPHONE ENCOUNTER
[facility] and [phone]     I provided my number for her to call back or the triage line in regards to her questions / concerns     Stacie Clark Communication Facilitator on 1/20/2022 at 11:08 AM

## 2022-01-20 NOTE — TELEPHONE ENCOUNTER
[facility] Call Center    Phone Message    May a detailed message be left on voicemail: yes     Reason for Call: Other: Asma calling to request a call back. She would like to speak to pt's care team to discuss care. Please call her back to discuss.      Action Taken: Message routed to:  [facility]    Travel Screening: Not Applicable

## 2022-03-17 ENCOUNTER — OFFICE VISIT (OUTPATIENT)
Dept: OPHTHALMOLOGY | Facility: CLINIC | Age: 82
End: 2022-03-17
Attending: OPHTHALMOLOGY
Payer: COMMERCIAL

## 2022-03-17 DIAGNOSIS — H04.123 BILATERAL DRY EYES: ICD-10-CM

## 2022-03-17 DIAGNOSIS — H02.135 SENILE ECTROPION OF BOTH LOWER EYELIDS: ICD-10-CM

## 2022-03-17 DIAGNOSIS — H40.003 GLAUCOMA SUSPECT OF BOTH EYES: Primary | ICD-10-CM

## 2022-03-17 DIAGNOSIS — H34.8120 CENTRAL RETINAL VEIN OCCLUSION WITH MACULAR EDEMA OF LEFT EYE (H): ICD-10-CM

## 2022-03-17 DIAGNOSIS — H02.132 SENILE ECTROPION OF BOTH LOWER EYELIDS: ICD-10-CM

## 2022-03-17 PROCEDURE — 99214 OFFICE O/P EST MOD 30 MIN: CPT | Mod: 25 | Performed by: OPHTHALMOLOGY

## 2022-03-17 PROCEDURE — G0463 HOSPITAL OUTPT CLINIC VISIT: HCPCS | Mod: 25

## 2022-03-17 PROCEDURE — 250N000011 HC RX IP 250 OP 636: Performed by: OPHTHALMOLOGY

## 2022-03-17 PROCEDURE — 92134 CPTRZ OPH DX IMG PST SGM RTA: CPT | Performed by: OPHTHALMOLOGY

## 2022-03-17 PROCEDURE — 92133 CPTRZD OPH DX IMG PST SGM ON: CPT | Performed by: OPHTHALMOLOGY

## 2022-03-17 PROCEDURE — 67515 INJECT/TREAT EYE SOCKET: CPT | Mod: LT | Performed by: OPHTHALMOLOGY

## 2022-03-17 RX ORDER — TRIAMCINOLONE ACETONIDE 40 MG/ML
40 INJECTION, SUSPENSION INTRA-ARTICULAR; INTRAMUSCULAR ONCE
Status: COMPLETED | OUTPATIENT
Start: 2022-03-17 | End: 2022-03-17

## 2022-03-17 RX ADMIN — TRIAMCINOLONE ACETONIDE 40 MG: 40 INJECTION, SUSPENSION INTRA-ARTICULAR; INTRAMUSCULAR at 14:00

## 2022-03-17 ASSESSMENT — EXTERNAL EXAM - LEFT EYE: OS_EXAM: NORMAL

## 2022-03-17 ASSESSMENT — VISUAL ACUITY
OD_PH_CC+: -2
METHOD: SNELLEN - LINEAR
OS_PH_CC: 20/500
CORRECTION_TYPE: GLASSES
OD_PH_CC: 20/30
OD_CC: 20/40

## 2022-03-17 ASSESSMENT — REFRACTION_WEARINGRX
OS_CYLINDER: SPHERE
OD_CYLINDER: SPHERE
OS_SPHERE: +1.50
SPECS_TYPE: SVL READERS
OS_SPHERE: -0.50
OD_CYLINDER: SPHERE
OS_CYLINDER: SPHERE
SPECS_TYPE: SVL - DISTANCE
OD_SPHERE: -0.75
OD_SPHERE: +1.75

## 2022-03-17 ASSESSMENT — CONF VISUAL FIELD
OS_INFERIOR_TEMPORAL_RESTRICTION: 3
OS_SUPERIOR_TEMPORAL_RESTRICTION: 3
OD_SUPERIOR_NASAL_RESTRICTION: 3
OS_SUPERIOR_NASAL_RESTRICTION: 3
OD_SUPERIOR_TEMPORAL_RESTRICTION: 3

## 2022-03-17 ASSESSMENT — TONOMETRY
IOP_METHOD: TONOPEN
OS_IOP_MMHG: 22
OD_IOP_MMHG: 20

## 2022-03-17 ASSESSMENT — CUP TO DISC RATIO
OD_RATIO: 0.5
OS_RATIO: 0.45

## 2022-03-17 ASSESSMENT — EXTERNAL EXAM - RIGHT EYE: OD_EXAM: NORMAL

## 2022-03-17 NOTE — PROGRESS NOTES
Chief Complaint(s) and History of Present Illness(es)     Glaucoma Suspect Follow Up      Additional comments: 7 month follow up both eyes.              Comments     Pt here with her daughter today.  Pt states vision fluctuates daily. Right eye continues to tear frequently.  Occasional eye pain in each eye that comes and goes, unclear if pain is from burning sensation.    JESSICA Mackey March 17, 2022 12:57 PM                        Review of systems for the eyes was negative other than the pertinent positives/negatives listed in the HPI.      Assessment & Plan      Raheem Mark is a 81 year old female with the following diagnoses:   1. Glaucoma suspect of both eyes    2. Central retinal vein occlusion with macular edema of left eye    3. Bilateral dry eyes    4. Senile ectropion of both lower eyelids         S/P Eylea left eye 12/1/20; 5/4/2021  S/P Avastin 10/20/20  S/P left eye STK June 2021      Last seen 8/2021  Feels her right eye is somewhat worse with vision and has been watering more  Right eye stable dilated fundus exam, but more dry  Eyelid malposition contributing  Discussed oculoplastics consult, they would like to defer and try more aggressive lubrication for now    Increase artificial tears   Start tear ointment at bedtime     Left eye with worsening cystoid macular edema 2/2 central retinal vein occlusion  RBA to ST Kenalog discussed, consent obtained, will proceed today.   Return precautions reviewed     Patient disposition:   Return in about 3 months (around 6/17/2022) for VT only, OCT Macula.     Attending Physician Attestation:  Complete documentation of historical and exam elements from today's encounter can be found in the full encounter summary report (not reduplicated in this progress note).  I personally obtained the chief complaint(s) and history of present illness.  I confirmed and edited as necessary the review of systems, past medical/surgical history, family history, social  history, and examination findings as documented by others; and I examined the patient myself.  I personally reviewed the relevant tests, images, and reports as documented above.  I formulated and edited as necessary the assessment and plan and discussed the findings and management plan with the patient and family.Attending Physician Image/Tesing Attestation: I personally reviewed the ophthalmic test(s) associated with this encounter, agree with the interpretation(s) as documented by the resident/fellow, and have edited the corresponding report(s) as necessary.   Attending Physician Procedure Attestation: I was present for the entire procedure     . - Rubens Loaiza MD

## 2022-03-17 NOTE — NURSING NOTE
Chief Complaints and History of Present Illnesses   Patient presents with     Glaucoma Suspect Follow Up     7 month follow up both eyes.     Chief Complaint(s) and History of Present Illness(es)     Glaucoma Suspect Follow Up     Comments: 7 month follow up both eyes.              Comments     Pt here with her daughter today.  Pt states vision fluctuates daily. Right eye continues to tear frequently.  Occasional eye pain in each eye that comes and goes, unclear if pain is from burning sensation.    JESSICA Mackey March 17, 2022 12:57 PM

## 2022-06-09 ENCOUNTER — HOSPITAL ENCOUNTER (EMERGENCY)
Facility: CLINIC | Age: 82
End: 2022-06-09
Payer: COMMERCIAL

## 2022-08-25 ENCOUNTER — TELEPHONE (OUTPATIENT)
Dept: OPHTHALMOLOGY | Facility: CLINIC | Age: 82
End: 2022-08-25

## 2022-08-25 ENCOUNTER — OFFICE VISIT (OUTPATIENT)
Dept: OPHTHALMOLOGY | Facility: CLINIC | Age: 82
End: 2022-08-25
Attending: OPHTHALMOLOGY
Payer: COMMERCIAL

## 2022-08-25 DIAGNOSIS — H40.003 GLAUCOMA SUSPECT OF BOTH EYES: ICD-10-CM

## 2022-08-25 DIAGNOSIS — Z96.1 PSEUDOPHAKIA OF BOTH EYES: ICD-10-CM

## 2022-08-25 DIAGNOSIS — H34.8120 CENTRAL RETINAL VEIN OCCLUSION WITH MACULAR EDEMA OF LEFT EYE (H): Primary | ICD-10-CM

## 2022-08-25 DIAGNOSIS — H04.123 BILATERAL DRY EYES: ICD-10-CM

## 2022-08-25 DIAGNOSIS — H02.135 SENILE ECTROPION OF BOTH LOWER EYELIDS: ICD-10-CM

## 2022-08-25 DIAGNOSIS — H02.132 SENILE ECTROPION OF BOTH LOWER EYELIDS: ICD-10-CM

## 2022-08-25 PROCEDURE — 92134 CPTRZ OPH DX IMG PST SGM RTA: CPT | Performed by: OPHTHALMOLOGY

## 2022-08-25 PROCEDURE — 67515 INJECT/TREAT EYE SOCKET: CPT | Mod: LT | Performed by: OPHTHALMOLOGY

## 2022-08-25 PROCEDURE — 250N000011 HC RX IP 250 OP 636: Performed by: OPHTHALMOLOGY

## 2022-08-25 PROCEDURE — 99214 OFFICE O/P EST MOD 30 MIN: CPT | Mod: 25 | Performed by: OPHTHALMOLOGY

## 2022-08-25 PROCEDURE — G0463 HOSPITAL OUTPT CLINIC VISIT: HCPCS

## 2022-08-25 RX ORDER — TRIAMCINOLONE ACETONIDE 40 MG/ML
40 INJECTION, SUSPENSION INTRA-ARTICULAR; INTRAMUSCULAR ONCE
Status: COMPLETED | OUTPATIENT
Start: 2022-08-25 | End: 2022-08-25

## 2022-08-25 RX ORDER — DORZOLAMIDE HYDROCHLORIDE AND TIMOLOL MALEATE 20; 5 MG/ML; MG/ML
1 SOLUTION/ DROPS OPHTHALMIC 2 TIMES DAILY
Qty: 60 ML | Refills: 3 | Status: SHIPPED | OUTPATIENT
Start: 2022-08-25 | End: 2023-01-02

## 2022-08-25 RX ADMIN — TRIAMCINOLONE ACETONIDE 40 MG: 40 INJECTION, SUSPENSION INTRA-ARTICULAR; INTRAMUSCULAR at 11:12

## 2022-08-25 ASSESSMENT — EXTERNAL EXAM - RIGHT EYE: OD_EXAM: NORMAL

## 2022-08-25 ASSESSMENT — CONF VISUAL FIELD
OD_SUPERIOR_TEMPORAL_RESTRICTION: 3
OD_SUPERIOR_NASAL_RESTRICTION: 3
OS_SUPERIOR_TEMPORAL_RESTRICTION: 3
OS_SUPERIOR_NASAL_RESTRICTION: 3

## 2022-08-25 ASSESSMENT — VISUAL ACUITY
METHOD: SNELLEN - LINEAR
OD_CC+: -2
OD_CC: 20/25
CORRECTION_TYPE: GLASSES
OS_CC: 2/200E

## 2022-08-25 ASSESSMENT — REFRACTION_WEARINGRX
OD_SPHERE: -0.75
OS_SPHERE: +1.50
OS_CYLINDER: SPHERE
OD_CYLINDER: SPHERE
OS_SPHERE: -0.50
SPECS_TYPE: SVL - DISTANCE
OD_CYLINDER: SPHERE
OS_CYLINDER: SPHERE
SPECS_TYPE: SVL READERS
OD_SPHERE: +1.75

## 2022-08-25 ASSESSMENT — TONOMETRY
OS_IOP_MMHG: 16
OD_IOP_MMHG: 18
IOP_METHOD: APPLANATION

## 2022-08-25 ASSESSMENT — CUP TO DISC RATIO
OD_RATIO: 0.5
OS_RATIO: 0.45

## 2022-08-25 ASSESSMENT — EXTERNAL EXAM - LEFT EYE: OS_EXAM: NORMAL

## 2022-08-25 NOTE — NURSING NOTE
Chief Complaints and History of Present Illnesses   Patient presents with     Glaucoma Suspect Follow Up     Chief Complaint(s) and History of Present Illness(es)     Glaucoma Suspect Follow Up     Laterality: both eyes    Associated symptoms: tearing.  Negative for flashes and floaters              Comments     Here for glaucoma suspect. Vision has been fluctuating in both eyes since last visit. She notes tearing in the right eye - there is no improvements with lubricating drops. No eye pain.    Salomón Leyva COT 8:57 AM August 25, 2022

## 2022-08-25 NOTE — TELEPHONE ENCOUNTER
Reviewed with Dr. Loaiza's facilitator and able to see pt if late    Patrick Ghosh RN 9:39 AM 08/25/22          [facility] Call Center    Phone Message    May a detailed message be left on voicemail: yes     Reason for Call: Other:   Daughter has a flat tire that is currently being fixed and Pt has appt today at 8am. Writer offered to reschedule and also advised of the 15 min zamzam period. Daughter states that she needs this appt and plans to get there asap but might be closer to 830. Please call with question/concerns.    Action Taken: Other:  eye    Travel Screening: Not Applicable

## 2022-08-25 NOTE — PROGRESS NOTES
Chief Complaint(s) and History of Present Illness(es)     Glaucoma Suspect Follow Up     Laterality: both eyes    Associated symptoms: tearing.  Negative for flashes and floaters              Comments     Here for glaucoma suspect. Vision has been fluctuating in both eyes since   last visit. She notes tearing in the right eye - there is no improvements   with lubricating drops. No eye pain.    Salomón Leyva COT 8:57 AM August 25, 2022               Review of systems for the eyes was negative other than the pertinent positives/negatives listed in the HPI.      Assessment & Plan      Raheem Mark is a 82 year old female with the following diagnoses:   1. Central retinal vein occlusion with macular edema of left eye    2. Glaucoma suspect of both eyes    3. Pseudophakia of both eyes    4. Bilateral dry eyes         S/P Eylea left eye 12/1/20; 5/4/2021  S/P Avastin 10/20/20  S/P left eye STK June 2021; 3/17/22    Both eyes with continued intermittent blur and watering  Somewhat better with increased artificial tears and ointment    Eyelid malposition contributing  Discussed oculoplastics consult, will refer for ptosis and LL repair    Continue frequent artificial tears   Tear ointment at bedtime   Warm compresses     Intraocular pressure acceptable  Continue Cosopt twice a day both eyes   Improved, but persistent macular edema left eye  RBA to repeat PSTK discussed, consent obtained, will proceed today.       Patient disposition:   Return in about 4 months (around 12/25/2022) for DFE, OCT Macula.   Oculoplastics next available           Attending Physician Attestation:  Complete documentation of historical and exam elements from today's encounter can be found in the full encounter summary report (not reduplicated in this progress note).  I personally obtained the chief complaint(s) and history of present illness.  I confirmed and edited as necessary the review of systems, past medical/surgical history, family history,  social history, and examination findings as documented by others; and I examined the patient myself.  I personally reviewed the relevant tests, images, and reports as documented above.  I formulated and edited as necessary the assessment and plan and discussed the findings and management plan with the patient and family. . - Rubens Loaiza MD      Airway patent. Face-mask intact.

## 2022-08-26 NOTE — TELEPHONE ENCOUNTER
FUTURE VISIT INFORMATION      FUTURE VISIT INFORMATION:    Date: 10/10/22    Time: 12:15pm    Location: [facility] [place]  REFERRAL INFORMATION:   Referring provider:  Rubens Loaiza MD    Referring providers clinic:  [facility]    Reason for visit/diagnosis  ptosis and LL repair    RECORDS REQUESTED FROM:       Clinic name Comments Records Status Imaging Status   [facility] OV/notes 8/25/22  Ov/notes 10/20/20-8/25/22 EPIC

## 2022-10-10 ENCOUNTER — PRE VISIT (OUTPATIENT)
Dept: OPHTHALMOLOGY | Facility: CLINIC | Age: 82
End: 2022-10-10

## 2022-10-11 NOTE — TELEPHONE ENCOUNTER
FUTURE VISIT INFORMATION      FUTURE VISIT INFORMATION:    Date: 12/5/22    Time: 2:00pm    Location: [facility] [place]  REFERRAL INFORMATION:   Referring provider:  Rubens Loaiza MD    Referring providers clinic:  [facility]    Reason for visit/diagnosis  ptosis and LL repair     RECORDS REQUESTED FROM:         Clinic name Comments Records Status Imaging Status   [facility] OV/notes 8/25/22  Ov/notes 10/20/20-8/25/22 EPIC

## 2022-11-04 ENCOUNTER — TELEPHONE (OUTPATIENT)
Dept: OPHTHALMOLOGY | Facility: CLINIC | Age: 82
End: 2022-11-04

## 2022-11-04 NOTE — TELEPHONE ENCOUNTER
Spoke with patient's daughter regarding scheduling for a sooner appointment from the wait-list. Patient declined scheduling as offered. -Per Patient's daughter

## 2022-11-16 ENCOUNTER — TELEPHONE (OUTPATIENT)
Dept: OPHTHALMOLOGY | Facility: CLINIC | Age: 82
End: 2022-11-16

## 2022-11-16 NOTE — TELEPHONE ENCOUNTER
[facility] Call Center    Phone Message    May a detailed message be left on voicemail: yes     Reason for Call: Appointment Intake    Referring Provider Name: Dr Loaiza  Diagnosis and/or Symptoms: Eyelid malposition contributing, Discussed oculoplastics consult, will refer for ptosis and LL repair.    Patients daughter Dr. Nguyen called to see if Dr Workman can patient patient on the schedule tomorrow or Friday afternoon since that's the only time daughter can take patient in. Patient currently has an appointment with Dr Workman on 12/5 which will not work for them. Please call daughter back at [phone]. Thank you.    Action Taken: Message routed to:  [facility]    Travel Screening: Not Applicable

## 2022-11-17 NOTE — TELEPHONE ENCOUNTER
FUTURE VISIT INFORMATION      FUTURE VISIT INFORMATION:    Date: 11/28/22    Time: 1:30pm    Location: [facility] [place]  REFERRAL INFORMATION:   Referring provider:  Rubens Loaiza MD    Referring providers clinic:  [facility]    Reason for visit/diagnosis  ptosis and LL repair     RECORDS REQUESTED FROM:         Clinic name Comments Records Status Imaging Status   [facility] OV/notes 8/25/22  Ov/notes 10/20/20-8/25/22 EPIC

## 2022-11-17 NOTE — TELEPHONE ENCOUNTER
This is done. I called and spoke to patient's daughter and rescheduled appointment to a time that worked for her to attend as patient is hard of hearing and in a wheelchair.   Bonita Rothman RN RN 11:05 AM 11/17/22

## 2022-11-28 ENCOUNTER — OFFICE VISIT (OUTPATIENT)
Dept: OPHTHALMOLOGY | Facility: CLINIC | Age: 82
End: 2022-11-28
Attending: OPHTHALMOLOGY
Payer: COMMERCIAL

## 2022-11-28 ENCOUNTER — PRE VISIT (OUTPATIENT)
Dept: OPHTHALMOLOGY | Facility: CLINIC | Age: 82
End: 2022-11-28

## 2022-11-28 DIAGNOSIS — H02.206 LAGOPHTHALMOS OF EYELIDS OF BOTH EYES, UNSPECIFIED EYELID, UNSPECIFIED LAGOPHTHALMOS TYPE: ICD-10-CM

## 2022-11-28 DIAGNOSIS — H02.423 MYOGENIC PTOSIS OF EYELID OF BOTH EYES: Primary | ICD-10-CM

## 2022-11-28 DIAGNOSIS — H04.129 DRY EYE: ICD-10-CM

## 2022-11-28 DIAGNOSIS — H02.203 LAGOPHTHALMOS OF EYELIDS OF BOTH EYES, UNSPECIFIED EYELID, UNSPECIFIED LAGOPHTHALMOS TYPE: ICD-10-CM

## 2022-11-28 DIAGNOSIS — H02.539 EYELID RETRACTION, UNSPECIFIED LATERALITY: ICD-10-CM

## 2022-11-28 PROCEDURE — 92285 EXTERNAL OCULAR PHOTOGRAPHY: CPT | Performed by: OPHTHALMOLOGY

## 2022-11-28 PROCEDURE — 99213 OFFICE O/P EST LOW 20 MIN: CPT | Performed by: OPHTHALMOLOGY

## 2022-11-28 ASSESSMENT — VISUAL ACUITY
OD_SC: 20/30
OS_SC: CF@6FT
OD_SC+: -1
METHOD: SNELLEN - LINEAR

## 2022-11-28 ASSESSMENT — LAGOPHTHALMOS
OS_LAGOPHTHALMOS: 1
OD_LAGOPHTHALMOS: 1

## 2022-11-28 ASSESSMENT — REFRACTION_WEARINGRX
OS_SPHERE: +1.50
SPECS_TYPE: SVL READERS
OD_CYLINDER: SPHERE
OS_SPHERE: -0.50
OS_CYLINDER: SPHERE
OS_CYLINDER: SPHERE
OD_CYLINDER: SPHERE
SPECS_TYPE: SVL - DISTANCE
OD_SPHERE: -0.75
OD_SPHERE: +1.75

## 2022-11-28 ASSESSMENT — MARGIN REFLEX DISTANCE
OS_MRD1: -1
OD_MRD1: -1

## 2022-11-28 ASSESSMENT — EXTERNAL EXAM - RIGHT EYE: OD_EXAM: NORMAL

## 2022-11-28 ASSESSMENT — LEVATOR FUNCTION
OS_LEVATOR: 15
OD_LEVATOR: 15

## 2022-11-28 ASSESSMENT — EXTERNAL EXAM - LEFT EYE: OS_EXAM: NORMAL

## 2022-11-28 ASSESSMENT — TONOMETRY
OS_IOP_MMHG: 13
IOP_METHOD: ICARE
OD_IOP_MMHG: 13

## 2022-11-28 NOTE — NURSING NOTE
Chief Complaints and History of Present Illnesses   Patient presents with     Consult For     Pt here for consult on Eyelid malposition, referred by Dr Loaiza.      Chief Complaint(s) and History of Present Illness(es)     Consult For            Comments: Pt here for consult on Eyelid malposition, referred by Dr Loaiza.           Comments    Pt daughter is with today. She notes eyes feel dry and they are not closing properly. Daughter notes she puts in a lot of drops that just pour out of her eyes. She says her lids do not close when she is sleeping. Daughter feels like it is the lower lids that are affecting her eyes.   MANAV HUNT, COA 1:43 PM November 28, 2022

## 2022-11-28 NOTE — PROGRESS NOTES
Chief Complaints and History of Present Illnesses   Patient presents with     Consult For     Pt here for consult on Eyelid malposition, referred by Dr Loaiza.      Chief Complaint(s) and History of Present Illness(es)     Consult For     Additional comments: Pt here for consult on Eyelid malposition, referred   by Dr Loaiza.            Comments    Pt daughter is with today. She notes eyes feel dry and they are not   closing properly. Daughter notes she puts in a lot of drops that just pour   out of her eyes. She says her lids do not close when she is sleeping.   Daughter feels like it is the lower lids that are affecting her eyes.   UNIQUE BENITEZ 1:43 PM November 28, 2022          Assessment & Plan     Raheem Mark is a 82 year old female with the following diagnoses:   1. Myogenic ptosis of eyelid of both eyes    2. 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 Dry eye       PLAN:  Bilateral lower lid retraction repair - LATERAL TARSAL STRIP bilateral lower lid office/local                Attending Physician Attestation:  Complete documentation of historical and exam elements from today's encounter can be found in the full encounter summary report (not reduplicated in this progress note).  I personally obtained the chief complaint(s) and history of present illness.  I confirmed and edited as necessary the review of systems, past medical/surgical history, family history, social history, and examination findings as documented by others; and I examined the patient myself.  I personally reviewed the relevant tests, images, and reports as documented above.  I formulated and edited as necessary the assessment and plan and discussed the findings and management plan with the patient and family. I personally reviewed the ophthalmic test(s) associated with this encounter, agree with the interpretation(s) , and have  edited the corresponding report(s) as necessary.   -Jermaine Workman MD  2:07 PM 11/28/2022

## 2022-12-05 ENCOUNTER — PRE VISIT (OUTPATIENT)
Dept: OPHTHALMOLOGY | Facility: CLINIC | Age: 82
End: 2022-12-05

## 2022-12-14 ENCOUNTER — OFFICE VISIT (OUTPATIENT)
Dept: OPHTHALMOLOGY | Facility: CLINIC | Age: 82
End: 2022-12-14
Payer: COMMERCIAL

## 2022-12-14 ENCOUNTER — TELEPHONE (OUTPATIENT)
Dept: OPHTHALMOLOGY | Facility: CLINIC | Age: 82
End: 2022-12-14

## 2022-12-14 DIAGNOSIS — H02.539 EYELID RETRACTION, UNSPECIFIED LATERALITY: Primary | ICD-10-CM

## 2022-12-14 PROCEDURE — 67911 REVISE EYELID DEFECT: CPT | Mod: XS | Performed by: OPHTHALMOLOGY

## 2022-12-14 PROCEDURE — 67911 REVISE EYELID DEFECT: CPT | Mod: E2 | Performed by: OPHTHALMOLOGY

## 2022-12-14 RX ORDER — ERYTHROMYCIN 5 MG/G
OINTMENT OPHTHALMIC
Qty: 3.5 G | Refills: 0 | Status: SHIPPED | OUTPATIENT
Start: 2022-12-14 | End: 2023-01-02

## 2022-12-14 RX ORDER — NEOMYCIN POLYMYXIN B SULFATES AND DEXAMETHASONE 3.5; 10000; 1 MG/ML; [USP'U]/ML; MG/ML
1 SUSPENSION/ DROPS OPHTHALMIC 4 TIMES DAILY
Qty: 3 ML | Refills: 0 | Status: SHIPPED | OUTPATIENT
Start: 2022-12-14 | End: 2023-01-02

## 2022-12-14 RX ORDER — ERYTHROMYCIN 5 MG/G
OINTMENT OPHTHALMIC ONCE
Status: COMPLETED | OUTPATIENT
Start: 2022-12-14 | End: 2022-12-14

## 2022-12-14 RX ADMIN — ERYTHROMYCIN: 5 OINTMENT OPHTHALMIC at 13:18

## 2022-12-14 NOTE — PATIENT INSTRUCTIONS
Jermaine Workman M.D.    POST OPERATIVE INSTRUCTIONS   OFFICE EYELID SURGERY      Ice pack immediately after surgery. Alternate ten minutes on and ten minutes off for the first 24 - 48 hours, while in a reclined position with two pillows under your head while awake.     You can use Tylenol extra strength for pain as directed on the bottle.     Sleep with two pillows under your head for the first night following surgery.      If bandaged, remove the dressing 24 hours after surgery.     Use ointment along the incision both morning and evening until your return visit. If you get ointment in your eye, it will blur your vision slightly.   Instill eye drops four times a day  for 10 days.    Avoid aspirin or anti-inflammatory medications such as Advil or Motrin for one week following your surgery unless otherwise directed.     Avoid strenuous activity or straining for the first week after surgery.     Bathing and showers are OK but to facilitate healing, do not wash or apply water to the sutured areas.     Small amounts of bright red blood normally stain the bandages. Some blurring of vision can be expected due to drainage and ointment in the eyes.     If your eyes swell shut, you cannot establish vision in the operated eye, or the pain is not reasonably controlled with medication you must contact the eye clinic immediately.     If you should have a problem after normal office hours, you can reach the ophthalmologist on call at [phone]. If for some reason no one can be reached, proceed to the nearest emergency room for evaluation and treatment.

## 2022-12-14 NOTE — TELEPHONE ENCOUNTER
Spoke with patient's daughter regarding time change for today's appointment 12/14/22 to 1:30pm. Daughter is aware of time and location.-Per Patient's Daughter Dr. Ronal Nguyen

## 2022-12-14 NOTE — PROGRESS NOTES
PREOPERATIVE DIAGNOSIS: Bilateral lower eyelid retraction  POSTOPERATIVE DIAGNOSIS: Bilateral lower eyelid retraction  PROCEDURE:  Bilateral lower eyelid retraction  ANESTHESIA: Local infiltration of 2% lidocaine with epinephrinee.   SURGEON: Melony Gay MD.  ASSISTANT: Thais Bradley MD   COMPLICATIONS: None.   ESTIMATED BLOOD LOSS: Less than 5 mL.   HISTORY: Raheem Mark  presented with tearing  due to lower lid ectropion. After the risks, benefits and alternatives to the proposed procedure were explained, informed consent was obtained.   DESCRIPTION OF PROCEDURE: Raheem Mark was brought to the operating room and placed supine on the operating table. IV sedation was given. The lower lids and lateral canthal areas were infiltrated with local anesthetic. The area was prepped and draped in the typical fashion. Attention was directed to the right side. An 8 mm lateral canthal incision was made with a 15 blade and dissection carried down to the orbicularis with high temperature cautery. Lateral canthotomy and inferior cantholysis was performed with the cautery. A lateral tarsal strip was fashioned with the high temperature cautery and the rita scissors. The tarsal strip was secured to the lateral orbital rim periosteum with a double-armed 5-0 PDS suture in a horizontal mattress fashion. Lateral canthal angle was closed with a gray line to gray line suture of 6-0 Vicryl tied internally. Skin was closed with interrupted 6-0 plain gut sutures.  The patient tolerated the procedure well. Attention was directed to the left side where the same procedure was performed. Antibiotic ointment was applied to the incisions. Raheem Mark left the operating room in stable condition.     MELONY GAY MD    I was present for the entire procedure.   -Melony Gay M.D.1:00 PM 12/14/2022

## 2022-12-26 ENCOUNTER — TELEPHONE (OUTPATIENT)
Dept: OPHTHALMOLOGY | Facility: CLINIC | Age: 82
End: 2022-12-26

## 2022-12-26 NOTE — TELEPHONE ENCOUNTER
[facility] Call Center    Phone Message    May a detailed message be left on voicemail: yes     Reason for Call: Other: Patint's daughter, Dr Pedromar called and rescheduled patient's apt with Dr Loaiza from 12/27 to soonest available for 2/14. If patient needs to be seen sooner please call Asma back to get fit in sooner. Thank you.    Action Taken: Message routed to:  [facility]    Travel Screening: Not Applicable

## 2022-12-27 ENCOUNTER — TELEPHONE (OUTPATIENT)
Dept: OPHTHALMOLOGY | Facility: CLINIC | Age: 82
End: 2022-12-27

## 2022-12-27 NOTE — TELEPHONE ENCOUNTER
Patient's daughter called regarding rescheduling POST-OP Appointment. Rescheduled patient accordingly and patient's daughter is aware of time, date and location.-Per Patient's Daughter

## 2023-01-01 ENCOUNTER — TELEPHONE (OUTPATIENT)
Dept: OPHTHALMOLOGY | Facility: CLINIC | Age: 83
End: 2023-01-01
Payer: COMMERCIAL

## 2023-01-01 ENCOUNTER — OFFICE VISIT (OUTPATIENT)
Dept: OPHTHALMOLOGY | Facility: CLINIC | Age: 83
End: 2023-01-01
Attending: OPHTHALMOLOGY
Payer: COMMERCIAL

## 2023-01-01 DIAGNOSIS — H34.8120 CENTRAL RETINAL VEIN OCCLUSION WITH MACULAR EDEMA OF LEFT EYE (H): Primary | ICD-10-CM

## 2023-01-01 DIAGNOSIS — Z96.1 PSEUDOPHAKIA OF BOTH EYES: ICD-10-CM

## 2023-01-01 DIAGNOSIS — H02.423 MYOGENIC PTOSIS OF EYELID OF BOTH EYES: ICD-10-CM

## 2023-01-01 DIAGNOSIS — H02.135 SENILE ECTROPION OF BOTH LOWER EYELIDS: ICD-10-CM

## 2023-01-01 DIAGNOSIS — H04.123 CHRONICALLY DRY EYES, BILATERAL: ICD-10-CM

## 2023-01-01 DIAGNOSIS — H02.132 SENILE ECTROPION OF BOTH LOWER EYELIDS: ICD-10-CM

## 2023-01-01 DIAGNOSIS — H40.003 GLAUCOMA SUSPECT OF BOTH EYES: ICD-10-CM

## 2023-01-01 PROCEDURE — 67515 INJECT/TREAT EYE SOCKET: CPT | Mod: LT | Performed by: OPHTHALMOLOGY

## 2023-01-01 PROCEDURE — G0463 HOSPITAL OUTPT CLINIC VISIT: HCPCS | Mod: 25 | Performed by: OPHTHALMOLOGY

## 2023-01-01 PROCEDURE — 92134 CPTRZ OPH DX IMG PST SGM RTA: CPT | Performed by: OPHTHALMOLOGY

## 2023-01-01 PROCEDURE — 99214 OFFICE O/P EST MOD 30 MIN: CPT | Mod: 25 | Performed by: OPHTHALMOLOGY

## 2023-01-01 PROCEDURE — 250N000011 HC RX IP 250 OP 636: Mod: JZ | Performed by: STUDENT IN AN ORGANIZED HEALTH CARE EDUCATION/TRAINING PROGRAM

## 2023-01-01 RX ORDER — TRIAMCINOLONE ACETONIDE 40 MG/ML
40 INJECTION, SUSPENSION INTRA-ARTICULAR; INTRAMUSCULAR ONCE
Status: COMPLETED | OUTPATIENT
Start: 2023-01-01 | End: 2023-01-01

## 2023-01-01 RX ORDER — DORZOLAMIDE HYDROCHLORIDE AND TIMOLOL MALEATE 20; 5 MG/ML; MG/ML
1 SOLUTION/ DROPS OPHTHALMIC 2 TIMES DAILY
Qty: 60 ML | Refills: 2 | Status: SHIPPED | OUTPATIENT
Start: 2023-01-01

## 2023-01-01 RX ADMIN — TRIAMCINOLONE ACETONIDE 40 MG: 40 INJECTION, SUSPENSION INTRA-ARTICULAR; INTRAMUSCULAR at 09:42

## 2023-01-01 ASSESSMENT — REFRACTION_WEARINGRX
SPECS_TYPE: SVL - DISTANCE
OD_SPHERE: -0.75
SPECS_TYPE: SVL - DISTANCE
OD_CYLINDER: SPHERE
OS_SPHERE: +1.50
OS_CYLINDER: SPHERE
OS_CYLINDER: SPHERE
SPECS_TYPE: SVL READERS
OD_SPHERE: +1.75
OD_SPHERE: -0.75
OS_CYLINDER: SPHERE
OS_SPHERE: +1.50
OD_CYLINDER: SPHERE
OS_CYLINDER: SPHERE
OS_SPHERE: -0.50
SPECS_TYPE: SVL READERS
OD_SPHERE: +1.75
OS_SPHERE: -0.50

## 2023-01-01 ASSESSMENT — VISUAL ACUITY
OD_SC+: -2
OD_SC: 20/30
METHOD: SNELLEN - LINEAR
OS_SC: 4'/200 E CARD
OS_PH_SC: 20/500
OD_SC+: -1
OS_SC: 5/200E
OD_PH_SC: 20/40
OS_PH_SC: 20/300
METHOD: SNELLEN - LINEAR
OD_PH_SC+: -1
OD_SC: 20/60

## 2023-01-01 ASSESSMENT — CUP TO DISC RATIO
OS_RATIO: 0.45
OD_RATIO: 0.5
OD_RATIO: 0.5
OS_RATIO: 0.45

## 2023-01-01 ASSESSMENT — TONOMETRY
IOP_METHOD: TONOPEN
OD_IOP_MMHG: 18
OD_IOP_MMHG: 10
OS_IOP_MMHG: 16
IOP_METHOD: TONOPEN
OS_IOP_MMHG: 21

## 2023-01-01 ASSESSMENT — CONF VISUAL FIELD
OS_SUPERIOR_TEMPORAL_RESTRICTION: 3
OD_SUPERIOR_NASAL_RESTRICTION: 3
OD_SUPERIOR_NASAL_RESTRICTION: 3
OD_SUPERIOR_TEMPORAL_RESTRICTION: 3
OS_INFERIOR_TEMPORAL_RESTRICTION: 3
OD_INFERIOR_NASAL_RESTRICTION: 0
OS_SUPERIOR_NASAL_RESTRICTION: 3
OS_SUPERIOR_TEMPORAL_RESTRICTION: 3
OD_SUPERIOR_TEMPORAL_RESTRICTION: 3
OD_INFERIOR_TEMPORAL_RESTRICTION: 0
OS_SUPERIOR_NASAL_RESTRICTION: 3

## 2023-01-02 ENCOUNTER — OFFICE VISIT (OUTPATIENT)
Dept: OPHTHALMOLOGY | Facility: CLINIC | Age: 83
End: 2023-01-02
Payer: COMMERCIAL

## 2023-01-02 DIAGNOSIS — H02.132 SENILE ECTROPION OF BOTH LOWER EYELIDS: ICD-10-CM

## 2023-01-02 DIAGNOSIS — H40.003 GLAUCOMA SUSPECT OF BOTH EYES: ICD-10-CM

## 2023-01-02 DIAGNOSIS — Z98.890 POSTOPERATIVE EYE STATE: Primary | ICD-10-CM

## 2023-01-02 DIAGNOSIS — H02.135 SENILE ECTROPION OF BOTH LOWER EYELIDS: ICD-10-CM

## 2023-01-02 PROCEDURE — 99207 PR SERVICE NOT STAFFED W/SUPERV PROV: CPT | Performed by: OPHTHALMOLOGY

## 2023-01-02 RX ORDER — DORZOLAMIDE HYDROCHLORIDE AND TIMOLOL MALEATE 20; 5 MG/ML; MG/ML
1 SOLUTION/ DROPS OPHTHALMIC 2 TIMES DAILY
Qty: 60 ML | Refills: 3 | Status: SHIPPED | OUTPATIENT
Start: 2023-01-02 | End: 2023-01-01

## 2023-01-02 ASSESSMENT — TONOMETRY
IOP_METHOD: ICARE
OS_IOP_MMHG: 08
OD_IOP_MMHG: 08

## 2023-01-02 ASSESSMENT — CONF VISUAL FIELD
OD_SUPERIOR_NASAL_RESTRICTION: 0
OD_INFERIOR_NASAL_RESTRICTION: 0
METHOD: COUNTING FINGERS
OS_NORMAL: 1
OS_SUPERIOR_NASAL_RESTRICTION: 0
OS_INFERIOR_TEMPORAL_RESTRICTION: 0
OS_INFERIOR_NASAL_RESTRICTION: 0
OD_SUPERIOR_TEMPORAL_RESTRICTION: 0
OS_SUPERIOR_TEMPORAL_RESTRICTION: 0
OD_INFERIOR_TEMPORAL_RESTRICTION: 0
OD_NORMAL: 1

## 2023-01-02 ASSESSMENT — EXTERNAL EXAM - RIGHT EYE: OD_EXAM: NORMAL

## 2023-01-02 ASSESSMENT — VISUAL ACUITY
METHOD: SNELLEN - LINEAR
OD_SC: 20/40
OS_SC: CF 6'

## 2023-01-02 ASSESSMENT — EXTERNAL EXAM - LEFT EYE: OS_EXAM: NORMAL

## 2023-01-02 NOTE — PROGRESS NOTES
Chief Complaints and History of Present Illnesses   Patient presents with     Follow Up     S/P Bilateral lower eyelid retraction 12/14/22     Chief Complaint(s) and History of Present Illness(es)     Follow Up    In both eyes. Additional comments: S/P Bilateral lower eyelid retraction   12/14/22      Comments    Patient s/p bilateral lower eye lids retraction. Patient compliant with   drops and no longer using. Patient denies any other issues at this time.     UNIQUE Osorio January 2, 2023 11:25 AM     Patient is here for follow up of bilateral lower eyelid retraction repair.  No redness or pain.  Swelling is improving. She feels that the watering she experienced has improved significantly.             Assessment & Plan     Raheem Mark is a 82 year old female with the following diagnoses:   1. Postoperative eye state    2. Senile ectropion of both lower eyelids         Bilateral lower eyelid retraction repair   Post operative eye state  * Continue antibiotic ointment or bland lubricating ointment (eg vaseline or aquaphor) to the incision site BID  * Massage along the incision BID  * Warm soaks QID until all edema and ecchymoses resolve  * Return to clinic as needed           Eva Aguayo M.D.  PGY-2  Department of Ophthalmology

## 2023-01-02 NOTE — NURSING NOTE
Chief Complaints and History of Present Illnesses   Patient presents with     Follow Up     S/P Bilateral lower eyelid retraction 12/14/22     Chief Complaint(s) and History of Present Illness(es)     Follow Up            Laterality: both eyes    Comments: S/P Bilateral lower eyelid retraction 12/14/22          Comments    Patient s/p bilateral lower eye lids retraction. Patient compliant with drops and no longer using. Patient denies any other issues at this time.     Savannah Matute, UNIQUE January 2, 2023 11:25 AM

## 2023-01-02 NOTE — PATIENT INSTRUCTIONS
"- Apply warm compresses for 5 minutes three times a day until your bruising has resolved.  - Cool compresses can help with swelling and itching, you can alternate cool compresses with warm compresses if you find it helpful.  - Apply erythromycin ophthalmic ointment to your incision at bedtime until your tube is finished. Then you can apply Aquaphor or Vaseline to the incision at bedtime.   - The neomycin/polymixin/dexamethasone drop should be stopped at day 10. If you have symptoms of eye irritation, it is good to use over the counter artificial tears. Good brands include Refresh, Blink, and Systane. Do NOT get drops that are for \"red eyes.\"   - It is normal for the incision to appear raised, red, itch and have small lumps. You can gently massage any small bumps along the incision line. These can take up to six months to resolve.    "

## 2023-01-04 DIAGNOSIS — H34.8120 CENTRAL RETINAL VEIN OCCLUSION WITH MACULAR EDEMA OF LEFT EYE (H): Primary | ICD-10-CM

## 2023-01-11 ENCOUNTER — OFFICE VISIT (OUTPATIENT)
Dept: OPHTHALMOLOGY | Facility: CLINIC | Age: 83
End: 2023-01-11
Attending: STUDENT IN AN ORGANIZED HEALTH CARE EDUCATION/TRAINING PROGRAM
Payer: COMMERCIAL

## 2023-01-11 DIAGNOSIS — H34.8120 CENTRAL RETINAL VEIN OCCLUSION WITH MACULAR EDEMA OF LEFT EYE (H): ICD-10-CM

## 2023-01-11 PROCEDURE — G0463 HOSPITAL OUTPT CLINIC VISIT: HCPCS | Mod: 25

## 2023-01-11 PROCEDURE — 250N000011 HC RX IP 250 OP 636: Performed by: OPHTHALMOLOGY

## 2023-01-11 PROCEDURE — 92134 CPTRZ OPH DX IMG PST SGM RTA: CPT | Performed by: OPHTHALMOLOGY

## 2023-01-11 PROCEDURE — 67515 INJECT/TREAT EYE SOCKET: CPT | Mod: LT | Performed by: OPHTHALMOLOGY

## 2023-01-11 RX ORDER — TRIAMCINOLONE ACETONIDE 40 MG/ML
40 INJECTION, SUSPENSION INTRA-ARTICULAR; INTRAMUSCULAR ONCE
Status: COMPLETED | OUTPATIENT
Start: 2023-01-11 | End: 2023-01-11

## 2023-01-11 RX ADMIN — TRIAMCINOLONE ACETONIDE 40 MG: 40 INJECTION, SUSPENSION INTRA-ARTICULAR; INTRAMUSCULAR at 10:53

## 2023-01-11 ASSESSMENT — CONF VISUAL FIELD
OD_SUPERIOR_NASAL_RESTRICTION: 0
OS_SUPERIOR_NASAL_RESTRICTION: 0
OS_SUPERIOR_TEMPORAL_RESTRICTION: 0
OS_INFERIOR_NASAL_RESTRICTION: 0
OD_NORMAL: 1
OD_SUPERIOR_TEMPORAL_RESTRICTION: 0
OS_INFERIOR_TEMPORAL_RESTRICTION: 0
OD_INFERIOR_TEMPORAL_RESTRICTION: 0
OD_INFERIOR_NASAL_RESTRICTION: 0
OS_NORMAL: 1

## 2023-01-11 ASSESSMENT — TONOMETRY
IOP_METHOD: TONOPEN
OS_IOP_MMHG: 19
OD_IOP_MMHG: 17

## 2023-01-11 ASSESSMENT — VISUAL ACUITY
METHOD: SNELLEN - LINEAR
OD_PH_SC: 20/40
OD_SC: 20/60
OS_PH_SC: 20/600

## 2023-01-11 ASSESSMENT — CUP TO DISC RATIO
OS_RATIO: 0.45
OD_RATIO: 0.5

## 2023-01-11 NOTE — NURSING NOTE
Chief Complaints and History of Present Illnesses   Patient presents with     Follow Up     Follow up of Central retinal vein occlusion with macular edema of left eye.  .          Chief Complaint(s) and History of Present Illness(es)     Follow Up            Laterality: both eyes    Associated symptoms: Negative for eye pain, flashes and floaters    Treatments tried: eye drops    Comments: Follow up of Central retinal vein occlusion with macular edema of left eye.  .              Comments    No changes in vision.  Daughter says patient does not read anymore.  Only watches an iPad.  No concerns of either eye.  Eye meds: Cosopt twice a day both eyes last night @7pm  [place]a [place], [place] 1/11/2023 9:27 AM

## 2023-01-11 NOTE — PROGRESS NOTES
Chief Complaint(s) and History of Present Illness(es)     Follow Up            Laterality: both eyes    Associated symptoms: Negative for eye pain, flashes and floaters    Treatments tried: eye drops    Comments: Follow up of Central retinal vein occlusion with macular edema   of left eye.  .              Comments    No changes in vision.  Daughter says patient does not read anymore.  Only watches an iPad.  No concerns of either eye.  Eye meds: Cosopt twice a day both eyes last night @7pm  TERELL Tristan 1/11/2023 9:27 AM               Review of systems for the eyes was negative other than the pertinent positives/negatives listed in the HPI.      Assessment & Plan      Raheem Mark is a 83 year old female with the following diagnoses:   1. Central retinal vein occlusion with macular edema of left eye         Improved tearing since LTS both eyes with Ji  Unable to discern if vision improves with right upper lid elevation in clinic today.  Daughter will work with patient at home and consider f/u with Ji if they determine vision is improved with lid elevation for possible ptosis surgery.   S/P Eylea left eye 12/1/20; 5/4/2021  S/P Avastin 10/20/20  S/P left eye STK June 2021; 3/17/22; 8/25/2022     Continue frequent artificial tears   Tear ointment at bedtime   Warm compresses      Intraocular pressure acceptable  Continue Cosopt twice a day both eyes   Improved, but persistent macular edema left eye  RBA to repeat PSTK left eye discussed, consent obtained, will proceed today.   Return precautions reviewed         Patient disposition:   Return in about 3 months (around 4/11/2023) for VT only, OCT Macula.           Attending Physician Attestation:  Complete documentation of historical and exam elements from today's encounter can be found in the full encounter summary report (not reduplicated in this progress note).  I personally obtained the chief complaint(s) and history of present illness.  I  confirmed and edited as necessary the review of systems, past medical/surgical history, family history, social history, and examination findings as documented by others; and I examined the patient myself.  I personally reviewed the relevant tests, images, and reports as documented above.  I formulated and edited as necessary the assessment and plan and discussed the findings and management plan with the patient and family. . - Rubens Loaiza MD

## 2023-04-19 NOTE — PROGRESS NOTES
"Chief Complaint(s) and History of Present Illness(es)    HPI     Central Retinal Vein Occlusion Follow Up    In left eye.  This started months ago.  Charactertized as  blurred vision.  Since onset it is stable.  Associated symptoms include eye pain (intermittent sharp pain OU).  Treatments tried include eye drops, artificial tears and glasses. Additional comments: 3 month follow up for CRVO with macular edema left eye.     Patient notes vision is stable each eye in the last couple months. \"I see better when I use the artificial tears.\" Not much when referring to floaters and flashes.\"            Comments    Ocular meds:   - Cosopt BID each eye   - AT PRN each eye, \"much less than before\"    MUNA Perez 4:09 PM 04/19/2023            Last edited by Kate Galindo on 4/19/2023  4:09 PM.                     Review of systems for the eyes was negative other than the pertinent positives/negatives listed in the HPI.      Assessment & Plan      Raheem Mark is a 83 year old female with the following diagnoses:   1. Central retinal vein occlusion with macular edema of left eye    2. Pseudophakia of both eyes    3. Senile ectropion of both lower eyelids    4. Chronically dry eyes, bilateral         S/P Eylea left eye 12/1/20; 5/4/2021  S/P Avastin 10/20/20  S/P left eye STK June 2021; 3/17/22; 8/25/2022; 01/2023    - Continue frequent artificial tears   Tear ointment at bedtime   Warm compresses      Intraocular pressure acceptable  Continue Cosopt twice a day both eyes     Improved, but persistent macular edema left eye  RBA to repeat PSTK left eye discussed, consent obtained, will proceed today.   Return precautions reviewed         Patient disposition:   Return in about 3 months (around 7/19/2023) for DFE, OCT Macula.           Attending Physician Attestation:  Complete documentation of historical and exam elements from today's encounter can be found in the full encounter summary report (not reduplicated in this " progress note).  I personally obtained the chief complaint(s) and history of present illness.  I confirmed and edited as necessary the review of systems, past medical/surgical history, family history, social history, and examination findings as documented by others; and I examined the patient myself.  I personally reviewed the relevant tests, images, and reports as documented above.  I formulated and edited as necessary the assessment and plan and discussed the findings and management plan with the patient and family. Attending Physician Image/Tesing Attestation: I personally reviewed the ophthalmic test(s) associated with this encounter, agree with the interpretation(s) as documented by the resident/fellow, and have edited the corresponding report(s) as necessary.  Attending Physician Procedure Attestation: I was present for the entire procedure     . - Rubens Loaiza MD

## 2023-04-19 NOTE — NURSING NOTE
"Chief Complaints and History of Present Illnesses   Patient presents with     Central Retinal Vein Occlusion Follow Up     3 month follow up for CRVO with macular edema left eye.     Patient notes vision is stable each eye in the last couple months. \"I see better when I use the artificial tears.\" Not much when referring to floaters and flashes.\"      Chief Complaint(s) and History of Present Illness(es)     Central Retinal Vein Occlusion Follow Up            Laterality: left eye    Onset: months ago    Quality: blurred vision    Course: stable    Associated symptoms: eye pain (intermittent sharp pain OU)    Treatments tried: eye drops, artificial tears and glasses    Comments: 3 month follow up for CRVO with macular edema left eye.     Patient notes vision is stable each eye in the last couple months. \"I see better when I use the artificial tears.\" Not much when referring to floaters and flashes.\"           Comments    Ocular meds:   - Cosopt BID each eye   - AT PRN each eye, \"much less than before\"    Kate Galindo, COT 4:09 PM 04/19/2023                   "

## 2023-09-01 NOTE — TELEPHONE ENCOUNTER
dorzolamide-timolol (COSOPT) 2-0.5 % ophthalmic solution     60 mL 3 1/2/2023     Last Office Visit : 4-  Future Office visit:  9-

## 2023-09-20 NOTE — PROGRESS NOTES
Chief Complaint(s) and History of Present Illness(es)     Follow Up            Comments: 5 month follow up Central retinal vein occlusion with macular   edema of left eye          Comments    Pt with her daughter today. Daughter uncertain if vision has changed since   last visit. Pt does not read anymore.   No eye pain today, but RE was red 3 days ago due to pt rubbing it. Pt used AT's and redness improved.  No DM.    JESSICA Mackey September 20, 2023 8:32 AM                   Review of systems for the eyes was negative other than the pertinent positives/negatives listed in the HPI.      Assessment & Plan      Raheem Mark is a 83 year old female with the following diagnoses:   1. Central retinal vein occlusion with macular edema of left eye    2. Pseudophakia of both eyes    3. Senile ectropion of both lower eyelids    4. Myogenic ptosis of eyelid of both eyes    5. Chronically dry eyes, bilateral       Here for recheck.  Vision about the same.  Not reading anymore    S/P Eylea left eye 12/1/20; 5/4/2021  S/P Avastin 10/20/20  S/P left eye STK June 2021; 3/17/22; 8/25/2022; 01/2023; 4/2023       Significant macular edema left eye today  RBA to repeat PSTK left eye discussed, consent obtained, will proceed today.   Return precautions reviewed     Intraocular pressure acceptable  Continue Cosopt twice a day both eyes     Continue frequent artificial tears   Tear ointment at bedtime as needed   Consider repeat LTS right eye if dryness worsens  Warm compresses            Patient disposition:   Return in about 4 months (around 1/20/2024) for VT only, OCT Macula.           Attending Physician Attestation:  Complete documentation of historical and exam elements from today's encounter can be found in the full encounter summary report (not reduplicated in this progress note).  I personally obtained the chief complaint(s) and history of present illness.  I confirmed and edited as necessary the review of systems,  past medical/surgical history, family history, social history, and examination findings as documented by others; and I examined the patient myself.  I personally reviewed the relevant tests, images, and reports as documented above.  I formulated and edited as necessary the assessment and plan and discussed the findings and management plan with the patient and family. . - Rubens Loaiza MD

## 2023-09-20 NOTE — NURSING NOTE
Chief Complaints and History of Present Illnesses   Patient presents with    Follow Up     5 month follow up Central retinal vein occlusion with macular edema of left eye     Chief Complaint(s) and History of Present Illness(es)       Follow Up              Comments: 5 month follow up Central retinal vein occlusion with macular edema of left eye              Comments    Pt with her daughter today. Daughter uncertain if vision has changed since last visit. Pt does not read anymore.   No eye pain today, but RE was read 3 days ago due to pt rubbing it. Pt used AT's and redness improved.  No DM.    JESSICA Mackey September 20, 2023 8:32 AM

## (undated) RX ORDER — ERYTHROMYCIN 5 MG/G
OINTMENT OPHTHALMIC
Status: DISPENSED
Start: 2022-12-14